# Patient Record
Sex: MALE | Race: WHITE | Employment: UNEMPLOYED | URBAN - METROPOLITAN AREA
[De-identification: names, ages, dates, MRNs, and addresses within clinical notes are randomized per-mention and may not be internally consistent; named-entity substitution may affect disease eponyms.]

---

## 2019-09-21 ENCOUNTER — APPOINTMENT (OUTPATIENT)
Dept: CT IMAGING | Age: 24
End: 2019-09-21
Payer: COMMERCIAL

## 2019-09-21 ENCOUNTER — APPOINTMENT (OUTPATIENT)
Dept: GENERAL RADIOLOGY | Age: 24
End: 2019-09-21
Payer: COMMERCIAL

## 2019-09-21 ENCOUNTER — HOSPITAL ENCOUNTER (EMERGENCY)
Age: 24
Discharge: HOME | End: 2019-09-21
Attending: EMERGENCY MEDICINE
Payer: COMMERCIAL

## 2019-09-21 VITALS
RESPIRATION RATE: 26 BRPM | BODY MASS INDEX: 23.03 KG/M2 | TEMPERATURE: 98.4 F | HEIGHT: 72 IN | DIASTOLIC BLOOD PRESSURE: 86 MMHG | HEART RATE: 118 BPM | SYSTOLIC BLOOD PRESSURE: 141 MMHG | OXYGEN SATURATION: 97 % | WEIGHT: 170 LBS

## 2019-09-21 DIAGNOSIS — S00.83XA CONTUSION OF FACE, INITIAL ENCOUNTER: Primary | ICD-10-CM

## 2019-09-21 DIAGNOSIS — F10.920 ACUTE ALCOHOLIC INTOXICATION WITHOUT COMPLICATION (HCC): ICD-10-CM

## 2019-09-21 DIAGNOSIS — S60.221A CONTUSION OF RIGHT HAND, INITIAL ENCOUNTER: ICD-10-CM

## 2019-09-21 PROCEDURE — 73130 X-RAY EXAM OF HAND: CPT

## 2019-09-21 PROCEDURE — 96375 TX/PRO/DX INJ NEW DRUG ADDON: CPT

## 2019-09-21 PROCEDURE — 70486 CT MAXILLOFACIAL W/O DYE: CPT

## 2019-09-21 PROCEDURE — 70450 CT HEAD/BRAIN W/O DYE: CPT

## 2019-09-21 PROCEDURE — 99284 EMERGENCY DEPT VISIT MOD MDM: CPT

## 2019-09-21 PROCEDURE — 6360000002 HC RX W HCPCS: Performed by: EMERGENCY MEDICINE

## 2019-09-21 PROCEDURE — 96374 THER/PROPH/DIAG INJ IV PUSH: CPT

## 2019-09-21 PROCEDURE — 72125 CT NECK SPINE W/O DYE: CPT

## 2019-09-21 RX ORDER — MORPHINE SULFATE 4 MG/ML
4 INJECTION, SOLUTION INTRAMUSCULAR; INTRAVENOUS EVERY 30 MIN PRN
Status: DISCONTINUED | OUTPATIENT
Start: 2019-09-21 | End: 2019-09-21 | Stop reason: HOSPADM

## 2019-09-21 RX ORDER — NAPROXEN 500 MG/1
500 TABLET ORAL 2 TIMES DAILY PRN
Qty: 30 TABLET | Refills: 0 | Status: SHIPPED | OUTPATIENT
Start: 2019-09-21 | End: 2020-04-22

## 2019-09-21 RX ORDER — ONDANSETRON 2 MG/ML
4 INJECTION INTRAMUSCULAR; INTRAVENOUS EVERY 6 HOURS PRN
Status: DISCONTINUED | OUTPATIENT
Start: 2019-09-21 | End: 2019-09-21 | Stop reason: HOSPADM

## 2019-09-21 RX ADMIN — ONDANSETRON 4 MG: 2 INJECTION INTRAMUSCULAR; INTRAVENOUS at 03:50

## 2019-09-21 RX ADMIN — MORPHINE SULFATE 4 MG: 4 INJECTION, SOLUTION INTRAMUSCULAR; INTRAVENOUS at 03:50

## 2019-09-21 ASSESSMENT — PAIN SCALES - GENERAL
PAINLEVEL_OUTOF10: 8
PAINLEVEL_OUTOF10: 8

## 2019-09-21 ASSESSMENT — PAIN DESCRIPTION - PAIN TYPE: TYPE: ACUTE PAIN

## 2019-09-21 ASSESSMENT — PAIN DESCRIPTION - LOCATION: LOCATION: FACE
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
## 2019-09-21 NOTE — ED NOTES
Patient reports hx of depression. Denies any suicidal thoughts at this time.  Patient states \" its getting better, that girl right there is my whole life\"        Mode Wynn RN  09/21/19 2003
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
## 2020-02-27 ENCOUNTER — APPOINTMENT (OUTPATIENT)
Dept: CT IMAGING | Age: 25
End: 2020-02-27
Payer: COMMERCIAL

## 2020-02-27 ENCOUNTER — HOSPITAL ENCOUNTER (EMERGENCY)
Age: 25
Discharge: NURSING, REHAB OR LONG-TERM CARE | End: 2020-02-27
Attending: EMERGENCY MEDICINE
Payer: COMMERCIAL

## 2020-02-27 VITALS
OXYGEN SATURATION: 99 % | HEIGHT: 72 IN | TEMPERATURE: 98.4 F | HEART RATE: 92 BPM | DIASTOLIC BLOOD PRESSURE: 88 MMHG | RESPIRATION RATE: 28 BRPM | BODY MASS INDEX: 23.03 KG/M2 | SYSTOLIC BLOOD PRESSURE: 134 MMHG | WEIGHT: 170 LBS

## 2020-02-27 LAB
ACETAMINOPHEN LEVEL: 22.8 UG/ML (ref 15–30)
ALBUMIN SERPL-MCNC: 4.5 GM/DL (ref 3.4–5)
ALCOHOL SCREEN SERUM: NORMAL %WT/VOL
ALP BLD-CCNC: 105 IU/L (ref 40–128)
ALT SERPL-CCNC: 900 U/L (ref 10–40)
AMMONIA: 40 UMOL/L (ref 16–60)
ANION GAP SERPL CALCULATED.3IONS-SCNC: 18 MMOL/L (ref 4–16)
AST SERPL-CCNC: 1538 IU/L (ref 15–37)
BASE EXCESS: ABNORMAL (ref 0–3.3)
BASOPHILS ABSOLUTE: 0.1 K/CU MM
BASOPHILS RELATIVE PERCENT: 0.9 % (ref 0–1)
BILIRUB SERPL-MCNC: 1.9 MG/DL (ref 0–1)
BUN BLDV-MCNC: 15 MG/DL (ref 6–23)
CALCIUM SERPL-MCNC: 9.6 MG/DL (ref 8.3–10.6)
CHLORIDE BLD-SCNC: 97 MMOL/L (ref 99–110)
CO2: 20 MMOL/L (ref 21–32)
COMMENT: ABNORMAL
CREAT SERPL-MCNC: 1 MG/DL (ref 0.9–1.3)
DIFFERENTIAL TYPE: ABNORMAL
DOSE AMOUNT: ABNORMAL
DOSE AMOUNT: NORMAL
DOSE TIME: ABNORMAL
DOSE TIME: NORMAL
EOSINOPHILS ABSOLUTE: 0 K/CU MM
EOSINOPHILS RELATIVE PERCENT: 0.1 % (ref 0–3)
GFR AFRICAN AMERICAN: >60 ML/MIN/1.73M2
GFR NON-AFRICAN AMERICAN: >60 ML/MIN/1.73M2
GLUCOSE BLD-MCNC: 128 MG/DL (ref 70–99)
HAV IGM SER IA-ACNC: NON REACTIVE
HCO3 VENOUS: 22.4 MMOL/L (ref 19–25)
HCT VFR BLD CALC: 43.9 % (ref 42–52)
HEMOGLOBIN: 15.6 GM/DL (ref 13.5–18)
HEPATITIS B CORE IGM ANTIBODY: NON REACTIVE
HEPATITIS B SURFACE ANTIGEN: NON REACTIVE
HEPATITIS C ANTIBODY: NON REACTIVE
IMMATURE NEUTROPHIL %: 0.4 % (ref 0–0.43)
INR BLD: 1.16 INDEX
LACTATE DEHYDROGENASE: 1102 IU/L (ref 120–246)
LACTATE: 1.7 MMOL/L (ref 0.4–2)
LIPASE: 14 IU/L (ref 13–60)
LYMPHOCYTES ABSOLUTE: 0.8 K/CU MM
LYMPHOCYTES RELATIVE PERCENT: 8.7 % (ref 24–44)
MCH RBC QN AUTO: 31.3 PG (ref 27–31)
MCHC RBC AUTO-ENTMCNC: 35.5 % (ref 32–36)
MCV RBC AUTO: 88.2 FL (ref 78–100)
MONOCYTES ABSOLUTE: 1 K/CU MM
MONOCYTES RELATIVE PERCENT: 11.1 % (ref 0–4)
NUCLEATED RBC %: 0 %
O2 SAT, VEN: 94.3 % (ref 50–70)
OSMOLALITY: 286 MOS/L (ref 280–300)
PCO2, VEN: 37 MMHG (ref 38–52)
PDW BLD-RTO: 11.6 % (ref 11.7–14.9)
PH VENOUS: 7.39 (ref 7.32–7.42)
PLATELET # BLD: 233 K/CU MM (ref 140–440)
PMV BLD AUTO: 9.7 FL (ref 7.5–11.1)
PO2, VEN: 204 MMHG (ref 28–48)
POTASSIUM SERPL-SCNC: 3.7 MMOL/L (ref 3.5–5.1)
PROTHROMBIN TIME: 14.1 SECONDS (ref 11.7–14.5)
RBC # BLD: 4.98 M/CU MM (ref 4.6–6.2)
SALICYLATE LEVEL: <0.3 MG/DL (ref 15–30)
SEGMENTED NEUTROPHILS ABSOLUTE COUNT: 7.3 K/CU MM
SEGMENTED NEUTROPHILS RELATIVE PERCENT: 78.8 % (ref 36–66)
SODIUM BLD-SCNC: 135 MMOL/L (ref 135–145)
TOTAL CK: 169 IU/L (ref 38–174)
TOTAL IMMATURE NEUTOROPHIL: 0.04 K/CU MM
TOTAL NUCLEATED RBC: 0 K/CU MM
TOTAL PROTEIN: 7.1 GM/DL (ref 6.4–8.2)
WBC # BLD: 9.2 K/CU MM (ref 4–10.5)

## 2020-02-27 PROCEDURE — 96366 THER/PROPH/DIAG IV INF ADDON: CPT

## 2020-02-27 PROCEDURE — 85025 COMPLETE CBC W/AUTO DIFF WBC: CPT

## 2020-02-27 PROCEDURE — 74177 CT ABD & PELVIS W/CONTRAST: CPT

## 2020-02-27 PROCEDURE — 82805 BLOOD GASES W/O2 SATURATION: CPT

## 2020-02-27 PROCEDURE — G0480 DRUG TEST DEF 1-7 CLASSES: HCPCS

## 2020-02-27 PROCEDURE — 80074 ACUTE HEPATITIS PANEL: CPT

## 2020-02-27 PROCEDURE — 6360000002 HC RX W HCPCS: Performed by: PHYSICIAN ASSISTANT

## 2020-02-27 PROCEDURE — 99285 EMERGENCY DEPT VISIT HI MDM: CPT

## 2020-02-27 PROCEDURE — 2580000003 HC RX 258: Performed by: PHYSICIAN ASSISTANT

## 2020-02-27 PROCEDURE — 83605 ASSAY OF LACTIC ACID: CPT

## 2020-02-27 PROCEDURE — 93010 ELECTROCARDIOGRAM REPORT: CPT | Performed by: INTERNAL MEDICINE

## 2020-02-27 PROCEDURE — 96372 THER/PROPH/DIAG INJ SC/IM: CPT

## 2020-02-27 PROCEDURE — 80053 COMPREHEN METABOLIC PANEL: CPT

## 2020-02-27 PROCEDURE — 6360000004 HC RX CONTRAST MEDICATION: Performed by: PHYSICIAN ASSISTANT

## 2020-02-27 PROCEDURE — 93005 ELECTROCARDIOGRAM TRACING: CPT | Performed by: PHYSICIAN ASSISTANT

## 2020-02-27 PROCEDURE — 82550 ASSAY OF CK (CPK): CPT

## 2020-02-27 PROCEDURE — 83690 ASSAY OF LIPASE: CPT

## 2020-02-27 PROCEDURE — 82140 ASSAY OF AMMONIA: CPT

## 2020-02-27 PROCEDURE — 83615 LACTATE (LD) (LDH) ENZYME: CPT

## 2020-02-27 PROCEDURE — 96365 THER/PROPH/DIAG IV INF INIT: CPT

## 2020-02-27 PROCEDURE — 83930 ASSAY OF BLOOD OSMOLALITY: CPT

## 2020-02-27 PROCEDURE — 85610 PROTHROMBIN TIME: CPT

## 2020-02-27 RX ORDER — SODIUM CHLORIDE 0.9 % (FLUSH) 0.9 %
10 SYRINGE (ML) INJECTION 2 TIMES DAILY
Status: DISCONTINUED | OUTPATIENT
Start: 2020-02-27 | End: 2020-02-27 | Stop reason: HOSPADM

## 2020-02-27 RX ORDER — PROMETHAZINE HYDROCHLORIDE 25 MG/ML
25 INJECTION, SOLUTION INTRAMUSCULAR; INTRAVENOUS ONCE
Status: COMPLETED | OUTPATIENT
Start: 2020-02-27 | End: 2020-02-27

## 2020-02-27 RX ADMIN — PROMETHAZINE HYDROCHLORIDE 25 MG: 25 INJECTION INTRAMUSCULAR; INTRAVENOUS at 06:22

## 2020-02-27 RX ADMIN — ACETYLCYSTEINE 3860 MG: 200 INJECTION, SOLUTION INTRAVENOUS at 06:50

## 2020-02-27 RX ADMIN — ACETYLCYSTEINE 11560 MG: 200 INJECTION, SOLUTION INTRAVENOUS at 05:27

## 2020-02-27 RX ADMIN — IOPAMIDOL 80 ML: 755 INJECTION, SOLUTION INTRAVENOUS at 04:58

## 2020-02-27 ASSESSMENT — PAIN SCALES - GENERAL: PAINLEVEL_OUTOF10: 9

## 2020-02-27 ASSESSMENT — PAIN DESCRIPTION - ORIENTATION: ORIENTATION: LOWER

## 2020-02-27 ASSESSMENT — PAIN DESCRIPTION - FREQUENCY: FREQUENCY: CONTINUOUS

## 2020-02-27 ASSESSMENT — PAIN DESCRIPTION - LOCATION: LOCATION: ABDOMEN

## 2020-02-27 NOTE — ED NOTES
Report given to [employer] at this time     [facility] [place]  02/27/20 5848

## 2020-02-27 NOTE — ED NOTES
Pt talking with sister at this time on the phone     Brian Perez, [place]  02/27/20 0415

## 2020-02-27 NOTE — ED NOTES
Pt continues to rest with eyes closed. No further N/V observed. Resp even and unlabored. Sitter remains at bedside.       Bg Laurent RN  02/27/20 5021

## 2020-02-27 NOTE — ED NOTES
This nurse ask patient about suicidal or homicidal ideation with any thoughts of hurting himself or anyone else. Patient states \"not anyone else\". Patient has scabbed superficial cuts to bilateral arms when asked about that he states \" that's irrelivent\" patient does not elaborate on plan of suicidal ideation, out long this has been going on. Patient immediately placed on suicidal precautions. Patient remains on cardiac monitor per order by [facility]. Patients reports that it was tylenol PM that he took.       Jaylin Khanna, RN  02/27/20 1627

## 2020-02-27 NOTE — ED NOTES
Spoke with Agueda Lauren from lab notified of additional orders, states he needs to [place] out whats going on with the orders and will call this nurse back\"        Iona Duran RN  02/27/20 3675

## 2020-02-27 NOTE — ED PROVIDER NOTES
UE.  Neurologic:  Alert, oriented, somewhat lethargic. Psychiatric:  Uncooperative, flat affect. RADIOLOGY/PROCEDURES    Labs Reviewed   CBC WITH AUTO DIFFERENTIAL - Abnormal; Notable for the following components:       Result Value    MCH 31.3 (*)     RDW 11.6 (*)     Segs Relative 78.8 (*)     Lymphocytes % 8.7 (*)     Monocytes % 11.1 (*)     All other components within normal limits   COMPREHENSIVE METABOLIC PANEL W/ REFLEX TO MG FOR LOW K - Abnormal; Notable for the following components:    Chloride 97 (*)     CO2 20 (*)     Glucose 128 (*)     Total Bilirubin 1.9 (*)      (*)     AST 1,538 (*)     Anion Gap 18 (*)     All other components within normal limits   SALICYLATE LEVEL - Abnormal; Notable for the following components:    Salicylate Lvl <3.6 (*)     All other components within normal limits   BLOOD GAS, VENOUS - Abnormal; Notable for the following components:    pCO2, Rodolfo 37 (*)     pO2, [place]olfo 204 (*)     O2 Sat, [place]olfo 94.3 (*)     All other components within normal limits   LACTATE DEHYDROGENASE - Abnormal; Notable for the following components:    LD 1,102 (*)     All other components within normal limits   LIPASE   LACTIC ACID, PLASMA   ACETAMINOPHEN LEVEL   ETHANOL   PROTIME-INR   CK   HEPATITIS PANEL, ACUTE   OSMOLALITY   AMMONIA   URINE RT REFLEX TO CULTURE   URINE DRUG SCREEN     Ct Abdomen Pelvis W Iv Contrast Additional Contrast? None    Result Date: 2/27/2020  1. No acute findings in the abdomen or pelvis. 2. Hepatic steatosis     ED COURSE & MEDICAL DECISION MAKING    Pertinent Labs & Imaging studies reviewed. (See chart for details)  -  Patient seen and evaluated in the emergency department. -  Triage and nursing notes reviewed and incorporated. -  Old chart records reviewed and incorporated. -  Patient case discussed with attending physician, Dr. Suzanne Gage. They saw and examined patient.   -  Differential diagnosis includes: depression, suicidal, behavior disorder, schizophrenia, schizoaffective disorder, manic, dementia, delirium, alcohol intoxication, drug toxicity, and others  -  Work-up included:  See above  -  Poison Control was consulted upon patient's arrival to the ED. I discussed case with Isha Holt RN. Recommendation to initiate NAC protocol pending lab results. -  Patient treated with NAC in the ED.  -  Results discussed with patient. Normal white count, H&H. Total bili is 1.9, , AST 1500, Alk Phos 105. Anion gap is 18. Lactic is normal.  LDH 1100. INR WNL. Tylenol level 22. Negative ETOH. Negative salicylate. CT abd pelv shows hepatic steatosis, no acute findings. Dr. Ashkan Carmen discussed the case with Dr. Ashwin Aguilera, who feels patient would be best managed at [facility]. Case discussed there and patient accepted in transfer to their ED. CRITICAL CARE NOTE:  There was a high probability of clinically significant life-threatening deterioration of the patient's condition requiring my urgent intervention due to Tylenol overdose. Telemetry monitoring, review of labs and imaging, consult to Poison Control, GI, [facility], initiation of NAC protocol was performed to address this. Total critical care time is at least  50 minutes. This includes vital sign monitoring, pulse oximetry monitoring, telemetry monitoring, clinical response to the IV medications, reviewing the nursing notes, consultation time, dictation/documentation time, and interpretation of the lab work. This time excludes time spent performing procedures and separately billable procedures and family discussion time. FINAL IMPRESSION    1. Acute liver failure without hepatic coma    2. Encephalopathy    3.  Suicidal behavior with attempted self-injury [facility])                Bashir Sim PA-C  02/28/20 2021

## 2020-02-27 NOTE — ED NOTES
Pt rests quietly at present at this time. Resp even and unlabored.  Sitter remains 1:1.      Christine Nevarez RN  02/27/20 5086

## 2020-02-27 NOTE — ED NOTES
2/27/2020 1138- entered chart to find where pt was transferred to for family     Jenna Broderick RN  02/27/20 1130

## 2020-02-27 NOTE — ED PROVIDER NOTES
ED Attestation: PA/CLAUDIA  Face-to-face evaluation was performed by me in collaboration with the advanced practice provider to assess for significant health threats. On my exam: Patient appears uncomfortable and is less than fully oriented. Abdomen is diffusely tender but soft throughout. My differential includes: Possible hepatic injury secondary to Tylenol ingestion. Patient Tylenol is minimally elevated but according to him he is 13 hours from onset. Patient is transaminases are very elevated but normal alk phos and normal INR and normal ammonia. Case was discussed with Dr. Crow Mcdaniels who recommended transfer to [facility]. Patient was treated empirically with NAC. Patient was intermittently bradycardic which could be some hemodynamic instability secondary to the mixed toxidrome. Patient remained clinically stable throughout the remainder of my ED evaluation.     Kimberly Sarmiento MD  02/27/20 8319

## 2020-03-04 LAB
EKG ATRIAL RATE: 43 BPM
EKG DIAGNOSIS: NORMAL
EKG P AXIS: 13 DEGREES
EKG P-R INTERVAL: 124 MS
EKG Q-T INTERVAL: 522 MS
EKG QRS DURATION: 102 MS
EKG QTC CALCULATION (BAZETT): 441 MS
EKG R AXIS: 87 DEGREES
EKG T AXIS: 61 DEGREES
EKG VENTRICULAR RATE: 43 BPM

## 2020-04-22 ENCOUNTER — HOSPITAL ENCOUNTER (EMERGENCY)
Age: 25
Discharge: HOME | End: 2020-04-22
Payer: COMMERCIAL

## 2020-04-22 ENCOUNTER — APPOINTMENT (OUTPATIENT)
Dept: GENERAL RADIOLOGY | Age: 25
End: 2020-04-22
Payer: COMMERCIAL

## 2020-04-22 VITALS
OXYGEN SATURATION: 98 % | TEMPERATURE: 98.3 F | DIASTOLIC BLOOD PRESSURE: 89 MMHG | BODY MASS INDEX: 25.06 KG/M2 | SYSTOLIC BLOOD PRESSURE: 154 MMHG | HEART RATE: 97 BPM | RESPIRATION RATE: 20 BRPM | HEIGHT: 72 IN | WEIGHT: 185 LBS

## 2020-04-22 PROCEDURE — 73590 X-RAY EXAM OF LOWER LEG: CPT

## 2020-04-22 PROCEDURE — 99283 EMERGENCY DEPT VISIT LOW MDM: CPT

## 2020-04-22 RX ORDER — NAPROXEN 500 MG/1
500 TABLET ORAL 2 TIMES DAILY PRN
Qty: 20 TABLET | Refills: 0 | Status: SHIPPED | OUTPATIENT
Start: 2020-04-22 | End: 2020-05-02

## 2020-04-22 RX ORDER — IBUPROFEN 600 MG/1
600 TABLET ORAL ONCE
Status: DISCONTINUED | OUTPATIENT
Start: 2020-04-22 | End: 2020-04-22 | Stop reason: HOSPADM

## 2020-04-22 ASSESSMENT — PAIN DESCRIPTION - PAIN TYPE: TYPE: ACUTE PAIN

## 2020-04-22 ASSESSMENT — PAIN SCALES - GENERAL: PAINLEVEL_OUTOF10: 7

## 2020-04-22 NOTE — ED PROVIDER NOTES
level: None   Occupational History    None   Social Needs    Financial resource strain: None    Food insecurity     Worry: None     Inability: None    Transportation needs     Medical: None     Non-medical: None   Tobacco Use    Smoking status: Current Every Day Smoker     Packs/day: 0.50    Smokeless tobacco: Never Used   Substance and Sexual Activity    Alcohol use: No    Drug use: No    Sexual activity: None   Lifestyle    Physical activity     Days per week: None     Minutes per session: None    Stress: None   Relationships    Social connections     Talks on phone: None     Gets together: None     Attends Religion service: None     Active member of club or organization: None     Attends meetings of clubs or organizations: None     Relationship status: None    Intimate partner violence     Fear of current or ex partner: None     Emotionally abused: None     Physically abused: None     Forced sexual activity: None   Other Topics Concern    None   Social History Narrative    None     History reviewed. No pertinent family history. PHYSICAL EXAM    VITAL SIGNS: BP (!) 154/89   Pulse 97   Temp 98.3 °F (36.8 °C) (Oral)   Resp 20   Ht 6' (1.829 m)   Wt 185 lb (83.9 kg)   SpO2 98%   BMI 25.09 kg/m²   Constitutional:  Well developed, well nourished, no acute distress, non-toxic appearance   HENT:  Atraumatic, Normocephalic, EOMIs, conjunctiva clear, nasal bones midline  Musculoskeletal:    Right lower leg exam:   -Inspection:  No obvious defects or deformities. There is a developing hematoma/ecchymotic bruising along the mid to proximal right anterior shin sitting onto the medial calf, approximately 5 x 6 cm in size. Overlying abrasion without purulence or active bleeding. Bruising and swelling is not circumferential to the right calf does not extend into the knee joint or ankle. - Palpation: No redness, or warmth. No localized knee joint or ankle pain or palpable effusion.   Localized cannot completely rule out an occult nondisplaced osseous abnormality. We discussed symptomatic care and outpatient follow-up with orthopedics and her PCP if symptoms persist or worsen as he may benefit from repeat x-rays or advanced imaging. No evidence of neurovascular/arterial injury at this time or ischemic limb or compartment syndrome requiring emergent advanced imaging. Low clinical suspicion for ischemic limb, compartment syndrome, intra-articular joint infection/septic arthritis, DVT, osteomyelitis, arterial/neurologic injury, necrotizing fasciitis, or infected/rapidly expanding hematoma. Patient is discharged in stable condition. Educated on [address] therapy. Given prescription for anti-inflammatories. May continue weightbearing as tolerated. Encouraged rigorous ice locations elevation. . Patient is instructed to followup with orthopedics in 7-10 days, sooner with worsened symptoms. Return precautions back to the ED discussed for any new or worsening symptoms. Patient does not have PCP so I have given him/her doctor of the day contact information and encourage him/her to establish care and followup in the next 1-2 days. Patient/surrogate and I discussed the incidental findings noted on imaging studies today - benign osteochondroma. We discussed the importance of timely outpatient follow-up for further evaluation and management as cannot completely rule out a malignant lesion or process. He or she understands that failure to follow up could increase risk of delayed diagnosis and potentially worsening condition. Diagnosis and plan discussed in detail with patient who understands and agrees. Patient agrees to return emergency department if symptoms worsen or any new symptoms develop.     In light of current events, I did utilize appropriate PPE (including N95 face mask, safety glasses, gloves, as recommended by the health facility/national standard best practice, during my bedside interactions with the

## 2020-12-27 ENCOUNTER — HOSPITAL ENCOUNTER (OUTPATIENT)
Age: 25
Setting detail: SPECIMEN
Discharge: HOME | End: 2020-12-27
Payer: COMMERCIAL

## 2020-12-27 PROCEDURE — U0002 COVID-19 LAB TEST NON-CDC: HCPCS

## 2020-12-30 LAB
SARS-COV-2: NOT DETECTED
SOURCE: NORMAL

## 2022-07-02 ENCOUNTER — HOSPITAL ENCOUNTER (EMERGENCY)
Age: 27
Discharge: HOME | End: 2022-07-02
Attending: EMERGENCY MEDICINE
Payer: COMMERCIAL

## 2022-07-02 ENCOUNTER — APPOINTMENT (OUTPATIENT)
Dept: CT IMAGING | Age: 27
End: 2022-07-02
Payer: COMMERCIAL

## 2022-07-02 VITALS
TEMPERATURE: 98 F | HEIGHT: 72 IN | WEIGHT: 180 LBS | DIASTOLIC BLOOD PRESSURE: 70 MMHG | RESPIRATION RATE: 16 BRPM | SYSTOLIC BLOOD PRESSURE: 145 MMHG | HEART RATE: 78 BPM | BODY MASS INDEX: 24.38 KG/M2 | OXYGEN SATURATION: 99 %

## 2022-07-02 DIAGNOSIS — T40.2X1A OPIOID OVERDOSE, ACCIDENTAL OR UNINTENTIONAL, INITIAL ENCOUNTER (HCC): Primary | ICD-10-CM

## 2022-07-02 PROCEDURE — 71250 CT THORAX DX C-: CPT

## 2022-07-02 PROCEDURE — 70450 CT HEAD/BRAIN W/O DYE: CPT

## 2022-07-02 PROCEDURE — 72125 CT NECK SPINE W/O DYE: CPT

## 2022-07-02 PROCEDURE — 99284 EMERGENCY DEPT VISIT MOD MDM: CPT

## 2022-07-03 NOTE — ED NOTES
Patient placed in police custody,handcuffed to the bed per spd     Ryan Serna, [place]  07/02/22 4347

## 2022-07-03 NOTE — ED PROVIDER NOTES
CHIEF COMPLAINT    Chief Complaint   Patient presents with    Drug Overdose     HPI  Petey Bar is a 32 y.o. male who presents to the ED with history of depression who presents in police custody after opioid overdose. Bystanders called EMS for unresponsiveness. He apparently received Narcan from bystanders as well as EMS with improvement of his respiratory mental status. Initially was found to have very minimal respirations on his own and was unresponsive. Patient arrives here and states he was consuming alcohol today and initially denies any recreational drug use. After being informed that Narcan will come up he states he may have taken something. He currently is complaining of headache and neck pain. No reports of trauma although the full history of outpatient that up on the ground is uncertain. Headache and neck pain described as throbbing aching pain located along the occipital region of the head and midline of the cervical spine. Pain does not radiate. Nothing seems to make the pain particular better or worse. He denies homicidal ideation, suicidal ideation, chest pain, shortness of breath, nausea, vomiting, numbness, tingling. REVIEW OF SYSTEMS  Constitutional: No fever, chills or recent illness. Eye: No visual changes  HENT: No earache or sore throat. Resp: No SOB or productive cough. Cardio: No chest pain or palpitations. GI: No abdominal pain, nausea, vomiting, constipation or diarrhea. No melena. : No dysuria, urgency or frequency. Endocrine: No heat intolerance, no cold intolerance, no polydipsia   Lymphatics: No adenopathy  Musculoskeletal: Complains of neck pain  Neuro: Complains of headache  Psych: No homicidal or suicidal thoughts  Skin: No rash, No itching. ?  ? PAST MEDICAL HISTORY  History reviewed. No pertinent past medical history. FAMILY HISTORY  History reviewed. No pertinent family history.   SOCIAL HISTORY  Social History     Socioeconomic History    Marital status: Single     Spouse name: None    Number of children: None    Years of education: None    Highest education level: None   Occupational History    None   Tobacco Use    Smoking status: Current Every Day Smoker     Packs/day: 0.50    Smokeless tobacco: Never Used   Substance and Sexual Activity    Alcohol use: Yes    Drug use: Yes    Sexual activity: None   Other Topics Concern    None   Social History Narrative    None     Social Determinants of Health     Financial Resource Strain:     Difficulty of Paying Living Expenses: Not on file   Food Insecurity:     Worried About Running Out of Food in the Last Year: Not on file    Ronald of Food in the Last Year: Not on file   Transportation Needs:     Lack of Transportation (Medical): Not on file    Lack of Transportation (Non-Medical): Not on file   Physical Activity:     Days of Exercise per Week: Not on file    Minutes of Exercise per Session: Not on file   Stress:     Feeling of Stress : Not on file   Social Connections:     Frequency of Communication with Friends and Family: Not on file    Frequency of Social Gatherings with Friends and Family: Not on file    Attends Congregation Services: Not on file    Active Member of [address] [place] [address] or Organizations: Not on file    Attends Club or Organization Meetings: Not on file    Marital Status: Not on file   Intimate Partner Violence:     Fear of Current or Ex-Partner: Not on file    Emotionally Abused: Not on file    Physically Abused: Not on file    Sexually Abused: Not on file   Housing Stability:     Unable to Pay for Housing in the Last Year: Not on file    Number of [place] in the Last Year: Not on file    Unstable Housing in the Last Year: Not on file       SURGICAL HISTORY  History reviewed. No pertinent surgical history.   CURRENT MEDICATIONS  Previous Medications    NAPROXEN (NAPROSYN) 500 MG TABLET    Take 1 tablet by mouth 2 times daily as needed for Pain     ALLERGIES  Allergies Allergen Reactions    Amoxicillin-Pot Clavulanate        Nursing notes reviewed by myself for past medical history, family history, social history, surgical history, current medications, and allergies. PHYSICAL EXAM  VITAL SIGNS: Triage VS:    ED Triage Vitals [07/02/22 2046]   Enc Vitals Group      /80      Heart Rate 92      Resp 16      Temp       Temp src       SpO2 98 %      Weight 180 lb (81.6 kg)      Height 6' (1.829 m)      Head Circumference       Peak Flow       Pain Score       Pain Loc       Pain Edu? Excl. in [address]? Constitutional: Well developed, Well nourished, nontoxic appearing  HENT: Normocephalic, Atraumatic, Bilateral external ears normal, Oropharynx moist, No oral exudates, Nose normal.   Eyes: PERRL, EOMI, Conjunctiva normal, No discharge. No scleral icterus. Neck: Tenderness to palpation of midline cervical spine  Lymphatic: No lymphadenopathy noted. Cardiovascular: Normal heart rate, Normal rhythm, No murmurs, gallops or rubs. Thorax & Lungs: Normal breath sounds, No respiratory distress, No wheezing. Skin: Warm, Dry, Pink, No mottling, No erythema, No rash. Back: No tenderness, No CVA tenderness. Extremities: No edema, No tenderness, No cyanosis, Normal perfusion, No clubbing. Musculoskeletal: Good range of motion in all major joints as observed. No major deformities noted. Neurologic: Alert & oriented x 3, GCS 15, normal motor function, Normal sensory function, CN II-XII grossly intact as tested, No focal deficits noted. Psychiatric: Affect normal, Judgment normal, Mood normal.     RADIOLOGY  Labs Reviewed - No data to display  I personally reviewed the images. The radiologist's interpretation reveals:  Last Imaging results   CT CHEST WO CONTRAST   Preliminary Result   1. No acute cardiopulmonary disease. 2. No pneumothorax. Paraseptal emphysema is identified in the bilateral lung   apices.          CT HEAD WO CONTRAST   Final Result   Head CT: No acute intracranial abnormality detected. Cervical spine CT: No acute fracture or traumatic malalignment. Questionable small pneumothorax at the left lung apex. Further evaluation   with chest CT is recommended. CT CERVICAL SPINE WO CONTRAST   Final Result   Head CT: No acute intracranial abnormality detected. Cervical spine CT: No acute fracture or traumatic malalignment. Questionable small pneumothorax at the left lung apex. Further evaluation   with chest CT is recommended. MEDS GIVEN IN ED:  Medications - No data to display  [address]    12-year-old male presents the emergency department in police custody after receiving Narcan for suspected opioid overdose. He had improvement of his respiratory mental status receiving Narcan. Initial vital signs here are reassuring. He is nontoxic-appearing on exam.  He is complaining of occipital headache and neck pain with some midline cervical spine tenderness. His neurological exam is otherwise nonfocal GCS 15. He has no homicidal or suicidal thoughts. At this time we will obtain CT imaging of the head and cervical spine given the uncertain history on how patient ended up on the ground after the opioid overdose. We will observe him for 90 minutes and plan will be for discharge. Patient CT of the head is without acute intracranial pathology. CT of the cervical spine is without acute traumatic pathology but shows concern for possible pneumothorax in the left lung apex. Therefore CT of the chest was obtained and does not demonstrate any evidence of pneumothorax. Patient was observed for over 2 hours and 45 minutes and remained without oxygen saturation or worsening mental depression. At this time I feel he is appropriate for discharge. Return precautions provided.       Amount and/or Complexity of Data Reviewed  Clinical lab tests: reviewed  Decide to obtain previous medical records or to obtain history from someone other than the patient: yes       -  Patient seen and evaluated in the emergency department. -  Triage and nursing notes reviewed and incorporated. -  Old chart records reviewed and incorporated. -  Work-up included:  See above  -  Results discussed with patient. Appropriate PPE utilized as indicated for entire patient encounter? Time of Disposition: See timeline  ? New Prescriptions    No medications on file     FINAL IMPRESSION  1. Opioid overdose, accidental or unintentional, initial encounter [facility])        Electronically signed by:  [address], DO, 7/2/2022         [address], DO  07/02/22 1002

## 2022-07-03 NOTE — ED NOTES
Pt. reviewed discharge instructions, follow up instructions. Pt. verbalizes understanding with no further questions. Pt. ambulatory and not showing any signs of distress. Pt. Left in police custody.        Ronen Richter RN  07/02/22 8942

## 2022-07-03 NOTE — ED TRIAGE NOTES
To ED per squad for overdose informed per medics that they gave him 4 mg of narcan iv pta and bystanders may have given him some before they arrived,patient denied using any drugs,alert and oriented on arrival,spd at bedside

## 2022-07-19 ENCOUNTER — APPOINTMENT (OUTPATIENT)
Dept: CT IMAGING | Age: 27
End: 2022-07-19
Payer: COMMERCIAL

## 2022-07-19 ENCOUNTER — APPOINTMENT (OUTPATIENT)
Dept: GENERAL RADIOLOGY | Age: 27
End: 2022-07-19
Payer: COMMERCIAL

## 2022-07-19 ENCOUNTER — HOSPITAL ENCOUNTER (EMERGENCY)
Age: 27
Discharge: HOME | End: 2022-07-19
Attending: STUDENT IN AN ORGANIZED HEALTH CARE EDUCATION/TRAINING PROGRAM
Payer: COMMERCIAL

## 2022-07-19 VITALS
TEMPERATURE: 101.8 F | OXYGEN SATURATION: 99 % | BODY MASS INDEX: 24.38 KG/M2 | HEIGHT: 72 IN | WEIGHT: 180 LBS | SYSTOLIC BLOOD PRESSURE: 118 MMHG | RESPIRATION RATE: 22 BRPM | HEART RATE: 106 BPM | DIASTOLIC BLOOD PRESSURE: 56 MMHG

## 2022-07-19 DIAGNOSIS — R56.9 SEIZURE (HCC): Primary | ICD-10-CM

## 2022-07-19 DIAGNOSIS — U07.1 COVID: ICD-10-CM

## 2022-07-19 LAB
ALBUMIN SERPL-MCNC: 4 GM/DL (ref 3.4–5)
ALP BLD-CCNC: 75 IU/L (ref 40–129)
ALT SERPL-CCNC: 17 U/L (ref 10–40)
AMPHETAMINES: NEGATIVE
ANION GAP SERPL CALCULATED.3IONS-SCNC: 9 MMOL/L (ref 4–16)
AST SERPL-CCNC: 17 IU/L (ref 15–37)
BACTERIA: NEGATIVE /HPF
BARBITURATE SCREEN URINE: NEGATIVE
BASOPHILS ABSOLUTE: 0 K/CU MM
BASOPHILS RELATIVE PERCENT: 0.7 % (ref 0–1)
BENZODIAZEPINE SCREEN, URINE: NEGATIVE
BILIRUB SERPL-MCNC: 0.4 MG/DL (ref 0–1)
BILIRUBIN URINE: NEGATIVE MG/DL
BLOOD, URINE: NEGATIVE
BUN BLDV-MCNC: 8 MG/DL (ref 6–23)
CALCIUM SERPL-MCNC: 8.7 MG/DL (ref 8.3–10.6)
CANNABINOID SCREEN URINE: NEGATIVE
CHLORIDE BLD-SCNC: 101 MMOL/L (ref 99–110)
CLARITY: CLEAR
CO2: 26 MMOL/L (ref 21–32)
COCAINE METABOLITE: NEGATIVE
COLOR: COLORLESS
CREAT SERPL-MCNC: 0.8 MG/DL (ref 0.9–1.3)
DIFFERENTIAL TYPE: ABNORMAL
EOSINOPHILS ABSOLUTE: 0.1 K/CU MM
EOSINOPHILS RELATIVE PERCENT: 1.4 % (ref 0–3)
GFR AFRICAN AMERICAN: >60 ML/MIN/1.73M2
GFR NON-AFRICAN AMERICAN: >60 ML/MIN/1.73M2
GLUCOSE BLD-MCNC: 86 MG/DL (ref 70–99)
GLUCOSE, URINE: NEGATIVE MG/DL
HCT VFR BLD CALC: 34.8 % (ref 42–52)
HEMOGLOBIN: 12 GM/DL (ref 13.5–18)
IMMATURE NEUTROPHIL %: 0.3 % (ref 0–0.43)
KETONES, URINE: NEGATIVE MG/DL
LACTATE: 1.4 MMOL/L (ref 0.4–2)
LEUKOCYTE ESTERASE, URINE: NEGATIVE
LYMPHOCYTES ABSOLUTE: 0.2 K/CU MM
LYMPHOCYTES RELATIVE PERCENT: 3.4 % (ref 24–44)
MCH RBC QN AUTO: 30.7 PG (ref 27–31)
MCHC RBC AUTO-ENTMCNC: 34.5 % (ref 32–36)
MCV RBC AUTO: 89 FL (ref 78–100)
MONOCYTES ABSOLUTE: 0.5 K/CU MM
MONOCYTES RELATIVE PERCENT: 8.4 % (ref 0–4)
NITRITE URINE, QUANTITATIVE: NEGATIVE
NUCLEATED RBC %: 0 %
OPIATES, URINE: NEGATIVE
OXYCODONE: NEGATIVE
PDW BLD-RTO: 11.5 % (ref 11.7–14.9)
PH, URINE: 7.5 (ref 5–8)
PHENCYCLIDINE, URINE: NEGATIVE
PLATELET # BLD: 160 K/CU MM (ref 140–440)
PMV BLD AUTO: 9.9 FL (ref 7.5–11.1)
POTASSIUM SERPL-SCNC: 3.6 MMOL/L (ref 3.5–5.1)
PROTEIN UA: NEGATIVE MG/DL
RBC # BLD: 3.91 M/CU MM (ref 4.6–6.2)
RBC URINE: ABNORMAL /HPF (ref 0–3)
SARS-COV-2, NAAT: DETECTED
SEGMENTED NEUTROPHILS ABSOLUTE COUNT: 5 K/CU MM
SEGMENTED NEUTROPHILS RELATIVE PERCENT: 85.8 % (ref 36–66)
SODIUM BLD-SCNC: 136 MMOL/L (ref 135–145)
SOURCE: ABNORMAL
SPECIFIC GRAVITY UA: 1.01 (ref 1–1.03)
TOTAL IMMATURE NEUTOROPHIL: 0.02 K/CU MM
TOTAL NUCLEATED RBC: 0 K/CU MM
TOTAL PROTEIN: 6 GM/DL (ref 6.4–8.2)
TRICHOMONAS: ABNORMAL /HPF
UROBILINOGEN, URINE: 0.2 MG/DL (ref 0.2–1)
WBC # BLD: 5.8 K/CU MM (ref 4–10.5)
WBC UA: ABNORMAL /HPF (ref 0–2)

## 2022-07-19 PROCEDURE — 80307 DRUG TEST PRSMV CHEM ANLYZR: CPT

## 2022-07-19 PROCEDURE — 70450 CT HEAD/BRAIN W/O DYE: CPT

## 2022-07-19 PROCEDURE — 6360000002 HC RX W HCPCS: Performed by: PHYSICIAN ASSISTANT

## 2022-07-19 PROCEDURE — 6360000004 HC RX CONTRAST MEDICATION: Performed by: STUDENT IN AN ORGANIZED HEALTH CARE EDUCATION/TRAINING PROGRAM

## 2022-07-19 PROCEDURE — 71045 X-RAY EXAM CHEST 1 VIEW: CPT

## 2022-07-19 PROCEDURE — 85025 COMPLETE CBC W/AUTO DIFF WBC: CPT

## 2022-07-19 PROCEDURE — 87635 SARS-COV-2 COVID-19 AMP PRB: CPT

## 2022-07-19 PROCEDURE — 96375 TX/PRO/DX INJ NEW DRUG ADDON: CPT

## 2022-07-19 PROCEDURE — 81001 URINALYSIS AUTO W/SCOPE: CPT

## 2022-07-19 PROCEDURE — 74177 CT ABD & PELVIS W/CONTRAST: CPT

## 2022-07-19 PROCEDURE — 96365 THER/PROPH/DIAG IV INF INIT: CPT

## 2022-07-19 PROCEDURE — 87040 BLOOD CULTURE FOR BACTERIA: CPT

## 2022-07-19 PROCEDURE — 83605 ASSAY OF LACTIC ACID: CPT

## 2022-07-19 PROCEDURE — 2580000003 HC RX 258: Performed by: PHYSICIAN ASSISTANT

## 2022-07-19 PROCEDURE — 80053 COMPREHEN METABOLIC PANEL: CPT

## 2022-07-19 PROCEDURE — 99285 EMERGENCY DEPT VISIT HI MDM: CPT

## 2022-07-19 RX ORDER — 0.9 % SODIUM CHLORIDE 0.9 %
1000 INTRAVENOUS SOLUTION INTRAVENOUS ONCE
Status: COMPLETED | OUTPATIENT
Start: 2022-07-19 | End: 2022-07-19

## 2022-07-19 RX ORDER — ACETAMINOPHEN 500 MG
1000 TABLET ORAL EVERY 6 HOURS PRN
Qty: 60 TABLET | Refills: 0 | Status: SHIPPED | OUTPATIENT
Start: 2022-07-19 | End: 2022-07-29

## 2022-07-19 RX ORDER — LEVETIRACETAM 500 MG/1
500 TABLET ORAL 2 TIMES DAILY
Qty: 60 TABLET | Refills: 3 | Status: SHIPPED | OUTPATIENT
Start: 2022-07-19

## 2022-07-19 RX ORDER — NALBUPHINE HCL 10 MG/ML
10 AMPUL (ML) INJECTION ONCE
Status: COMPLETED | OUTPATIENT
Start: 2022-07-19 | End: 2022-07-19

## 2022-07-19 RX ORDER — ONDANSETRON 2 MG/ML
4 INJECTION INTRAMUSCULAR; INTRAVENOUS EVERY 30 MIN PRN
Status: DISCONTINUED | OUTPATIENT
Start: 2022-07-19 | End: 2022-07-19 | Stop reason: HOSPADM

## 2022-07-19 RX ORDER — IBUPROFEN 400 MG/1
800 TABLET ORAL ONCE
Status: DISCONTINUED | OUTPATIENT
Start: 2022-07-19 | End: 2022-07-19 | Stop reason: HOSPADM

## 2022-07-19 RX ORDER — IBUPROFEN 800 MG/1
800 TABLET ORAL EVERY 8 HOURS PRN
Qty: 20 TABLET | Refills: 0 | Status: SHIPPED | OUTPATIENT
Start: 2022-07-19 | End: 2022-07-29

## 2022-07-19 RX ORDER — SODIUM CHLORIDE 0.9 % (FLUSH) 0.9 %
5-40 SYRINGE (ML) INJECTION PRN
Status: DISCONTINUED | OUTPATIENT
Start: 2022-07-19 | End: 2022-07-19 | Stop reason: HOSPADM

## 2022-07-19 RX ORDER — SODIUM CHLORIDE 0.9 % (FLUSH) 0.9 %
5-40 SYRINGE (ML) INJECTION EVERY 12 HOURS SCHEDULED
Status: DISCONTINUED | OUTPATIENT
Start: 2022-07-19 | End: 2022-07-19 | Stop reason: HOSPADM

## 2022-07-19 RX ORDER — SODIUM CHLORIDE 9 MG/ML
INJECTION, SOLUTION INTRAVENOUS PRN
Status: DISCONTINUED | OUTPATIENT
Start: 2022-07-19 | End: 2022-07-19 | Stop reason: HOSPADM

## 2022-07-19 RX ADMIN — LEVETIRACETAM 1000 MG: 100 INJECTION, SOLUTION, CONCENTRATE INTRAVENOUS at 12:52

## 2022-07-19 RX ADMIN — IOPAMIDOL 75 ML: 755 INJECTION, SOLUTION INTRAVENOUS at 14:01

## 2022-07-19 RX ADMIN — NALBUPHINE HYDROCHLORIDE 10 MG: 10 INJECTION, SOLUTION INTRAMUSCULAR; INTRAVENOUS; SUBCUTANEOUS at 13:36

## 2022-07-19 RX ADMIN — ONDANSETRON 4 MG: 2 INJECTION INTRAMUSCULAR; INTRAVENOUS at 12:35

## 2022-07-19 RX ADMIN — SODIUM CHLORIDE 1000 ML: 9 INJECTION, SOLUTION INTRAVENOUS at 11:57

## 2022-07-19 ASSESSMENT — PAIN - FUNCTIONAL ASSESSMENT: PAIN_FUNCTIONAL_ASSESSMENT: 0-10

## 2022-07-19 ASSESSMENT — PAIN DESCRIPTION - DESCRIPTORS: DESCRIPTORS: ACHING

## 2022-07-19 ASSESSMENT — PAIN DESCRIPTION - LOCATION: LOCATION: ABDOMEN;BACK

## 2022-07-19 ASSESSMENT — PAIN SCALES - GENERAL
PAINLEVEL_OUTOF10: 9
PAINLEVEL_OUTOF10: 9

## 2022-07-19 ASSESSMENT — PAIN DESCRIPTION - ORIENTATION: ORIENTATION: RIGHT

## 2022-07-19 NOTE — ED PROVIDER NOTES
I independently examined and evaluated Isha Kennedy. In brief, 69-year-old male presenting from [facility] for possible seizure episode. Complaining of full body aches, abdominal pain, headache, neck and shoulder pain. He states that he has chronic neck and shoulder pain and was complaining of this on his last ER visit. He states he has a history of seizures as a child, and possible seizure recently on 7/2 for which he was seen in the ER, but does not take any seizure medicine. Focused exam revealed uncomfortable appearing, alert and oriented without focal neurologic deficits, no nuchal rigidity, abdominal tenderness to bilateral lower. Febrile and tachycardic. ED course: Sepsis work-up unremarkable. Normal lactate. Normal leukocytosis. Head CT without intracranial abnormality. Abdominal CT without any acute infection or pathology. Neck and shoulder pain is chronic per patient and relieved with ibuprofen. Do not suspect meningitis at this time as patient has no significant nuchal rigidity or altered mental status. COVID test positive. Suspect this time the patient's symptoms are secondary to his COVID infection. Symptoms improved after ibuprofen. Patient looks much more comfortable still remained alert, oriented and without focal neurologic deficits. Will be discharged with ibuprofen, Tylenol, and Keppra. Given strict return precautions for any chest pain, shortness of breath, or any further concerns. Has a nurse that he sees at the [facility]. Will need outpatient neuro follow up as soon as able. All diagnostic, treatment, and disposition decisions were made by myself in conjunction with the advanced practice provider. I personally saw the patient and performed a substantive portion of the visit including all aspects of the medical decision making. For all further details of the patient's emergency department visit, please see the advanced practice provider's documentation.     Comment: Please note this report has been produced using speech recognition software and may contain errors related to that system including errors in grammar, punctuation, and spelling, as well as words and phrases that may be inappropriate. If there are any questions or concerns please feel free to contact the dictating provider for clarification.         Cece Martinez MD  07/19/22 Dago Monzon MD  07/23/22 [id number]
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
## 2022-07-21 ASSESSMENT — ENCOUNTER SYMPTOMS
NAUSEA: 0
RHINORRHEA: 0
VOMITING: 0
CONSTIPATION: 0
DIARRHEA: 0
ABDOMINAL PAIN: 1
SHORTNESS OF BREATH: 0
BACK PAIN: 0
SORE THROAT: 0
COUGH: 0
EYE PAIN: 0

## 2022-07-21 NOTE — ED PROVIDER NOTES
[address] ENCOUNTER        Pt Name: Alexandra Cueva  MRN: [id number]  [place] 1995  Date of evaluation: 7/19/2022  Provider: Kimberlee Carias PA-C  PCP: No primary care provider on file. Note Started: 12:24 PM EDT       I have seen and evaluated this patient with my supervising physician Archana Larson    Triage CHIEF COMPLAINT       Chief Complaint   Patient presents with    Seizures     Witnessed per retirement nurse     Hypotension         HISTORY OF PRESENT ILLNESS   (Location/Symptom, Timing/Onset, Context/Setting, Quality, Duration, Modifying Factors, Severity)  Note limiting factors. Chief Complaint: Seizures    Alexandra Cueva is a 32 y.o. male who presents to the emergency department stating that he had a seizure. The history comes from the patient, also from the  and the nurse who took report from EMS. The seizure was witnessed by the nurse in retirement. He states that he has some pain to his head neck after the seizure, also admits to some abdominal pain. He was postictal for about 10 minutes after the seizure. Nursing Notes were all reviewed and agreed with or any disagreements were addressed in the HPI. REVIEW OF SYSTEMS    (2-9 systems for level 4, 10 or more for level 5)     Review of Systems   Constitutional:  Negative for chills, diaphoresis and fever. HENT:  Negative for congestion, ear pain, rhinorrhea and sore throat. Eyes:  Negative for pain and visual disturbance. Respiratory:  Negative for cough and shortness of breath. Cardiovascular:  Negative for chest pain, palpitations and leg swelling. Gastrointestinal:  Positive for abdominal pain. Negative for constipation, diarrhea, nausea and vomiting. Genitourinary:  Negative for decreased urine volume, dysuria, frequency and urgency. Musculoskeletal:  Negative for back pain and neck pain.    Skin:  Negative for rash and wound.   Neurological:  Negative for dizziness and light-headedness. PAST MEDICAL HISTORY   History reviewed. No pertinent past medical history. SURGICAL HISTORY   History reviewed. No pertinent surgical history. [address]       Discharge Medication List as of 7/19/2022  2:58 PM        CONTINUE these medications which have NOT CHANGED    Details   naproxen (NAPROSYN) 500 MG tablet Take 1 tablet by mouth 2 times daily as needed for Pain, Disp-20 tablet, R-0Print             ALLERGIES     Amoxicillin-pot clavulanate    FAMILYHISTORY     History reviewed. No pertinent family history. SOCIAL HISTORY       Social History     Socioeconomic History    Marital status: Single     Spouse name: None    Number of children: None    Years of education: None    Highest education level: None   Tobacco Use    Smoking status: Every Day     Packs/day: 0.50     Types: Cigarettes    Smokeless tobacco: Never   Substance and Sexual Activity    Alcohol use: Yes    Drug use: Yes     Frequency: 2.0 times per week     Types: Methamphetamines (Crystal Meth)     Comment: July 2 was the last known usage       SCREENINGS    [place] Coma Scale  Eye Opening: Spontaneous  Best Verbal Response: Oriented  Best Motor Response: Obeys commands  [place] Coma Scale Score: 15        PHYSICAL EXAM    (up to 7 for level 4, 8 or more for level 5)     ED Triage Vitals   BP Temp Temp Source Heart Rate Resp SpO2 Height Weight   07/19/22 1225 07/19/22 1146 07/19/22 1146 07/19/22 1146 07/19/22 1146 07/19/22 1146 07/19/22 1151 07/19/22 1231   (!) 135/92 (!) 101.8 °F (38.8 °C) Oral 92 23 99 % 6' (1.829 m) 180 lb (81.6 kg)       Physical Exam  Vitals and nursing note reviewed. Constitutional:       Appearance: Normal appearance. He is well-developed. He is not ill-appearing or diaphoretic. HENT:      Head: Normocephalic and atraumatic.       Right Ear: External ear normal.      Left Ear: External ear normal.      Nose: Nose normal.   Eyes: General: No scleral icterus. Right eye: No discharge. Left eye: No discharge. Extraocular Movements: Extraocular movements intact. Conjunctiva/sclera: Conjunctivae normal.      Pupils: Pupils are equal, round, and reactive to light. Cardiovascular:      Rate and Rhythm: Normal rate and regular rhythm. Heart sounds: Normal heart sounds. No murmur heard. No friction rub. No gallop. Pulmonary:      Effort: Pulmonary effort is normal. No respiratory distress. Breath sounds: Normal breath sounds. No stridor. No wheezing or rales. Chest:      Chest wall: No tenderness. Abdominal:      General: Bowel sounds are normal. There is no distension. Palpations: Abdomen is soft. There is no mass. Tenderness: There is abdominal tenderness in the right lower quadrant and suprapubic area. There is no guarding or rebound. Musculoskeletal:         General: Normal range of motion. Cervical back: Full passive range of motion without pain, normal range of motion and neck supple. No edema. Normal range of motion. Skin:     General: Skin is warm and dry. Coloration: Skin is not pale. Neurological:      General: No focal deficit present. Mental Status: He is alert and oriented to person, place, and time. He is not disoriented. GCS: GCS eye subscore is 4. GCS verbal subscore is 5. GCS motor subscore is 6. Cranial Nerves: No cranial nerve deficit. Sensory: Sensation is intact. Motor: Motor function is intact. No tremor or abnormal muscle tone. Coordination: Coordination is intact. Gait: Gait normal.      Deep Tendon Reflexes: Reflexes are normal and symmetric.    Psychiatric:         Mood and Affect: Mood normal.         Behavior: Behavior normal.       DIAGNOSTIC RESULTS   LABS:    Labs Reviewed   COVID-19, RAPID - Abnormal; Notable for the following components:       Result Value    SARS-CoV-2, NAAT DETECTED (*)     All other components within normal limits   CBC WITH AUTO DIFFERENTIAL - Abnormal; Notable for the following components:    RBC 3.91 (*)     Hemoglobin 12.0 (*)     Hematocrit 34.8 (*)     RDW 11.5 (*)     Segs Relative 85.8 (*)     Lymphocytes % 3.4 (*)     Monocytes % 8.4 (*)     All other components within normal limits   COMPREHENSIVE METABOLIC PANEL W/ REFLEX TO MG FOR LOW K - Abnormal; Notable for the following components:    Creatinine 0.8 (*)     Total Protein 6.0 (*)     All other components within normal limits   URINALYSIS WITH MICROSCOPIC - Abnormal; Notable for the following components:    Color, UA COLORLESS (*)     All other components within normal limits   CULTURE, BLOOD 1   CULTURE, BLOOD 2   LACTIC ACID   URINE DRUG SCREEN   LACTATE, SEPSIS       When ordered, only abnormal lab results are displayed. All other labs were within normal range or not returned as of this dictation. EKG: When ordered, EKG's are interpreted by the Emergency Department Physician in the absence of a cardiologist.  Please see their note for interpretation of EKG. RADIOLOGY:   Non-plain film images such as CT, Ultrasound and MRI are read by the radiologist. Plain radiographic images are visualized andpreliminarily interpreted by the  ED Provider with the below findings:        Interpretation perthe Radiologist below, if available at the time of this note:    CT ABDOMEN PELVIS W IV CONTRAST Additional Contrast? None   Final Result   Moderate to marked urinary bladder distention, which results in mild   bilateral hydronephrosis. There is mild bilateral perinephric fat stranding. Please correlate with any urinalysis evidence of urinary tract infection. Moderate to large amount of stool within the colon. Please correlate with   clinical evidence of constipation. CT HEAD WO CONTRAST   Final Result   No acute intracranial abnormality. XR CHEST PORTABLE   Final Result   No acute process.            CT HEAD WO CONTRAST    Result Date: 7/19/2022  EXAMINATION: CT OF THE HEAD WITHOUT CONTRAST  7/19/2022 1:30 pm TECHNIQUE: CT of the head was performed without the administration of intravenous contrast. Automated exposure control, iterative reconstruction, and/or weight based adjustment of the mA/kV was utilized to reduce the radiation dose to as low as reasonably achievable. COMPARISON: None. HISTORY: ORDERING SYSTEM PROVIDED HISTORY: seizure TECHNOLOGIST PROVIDED HISTORY: If patient is on cardiac monitor and/or pulse ox, they may be taken off cardiac monitor and pulse ox, left on O2 if currently on. All monitors reattached when patient returns to room. Has a \"code stroke\" or \"stroke alert\" been called? ->No Reason for exam:->seizure Decision Support Exception - unselect if not a suspected or confirmed emergency medical condition->Emergency Medical Condition (MA) Reason for Exam: Seizures; Hypotension FINDINGS: BRAIN/VENTRICLES: There is no acute intracranial hemorrhage, mass effect or midline shift. No abnormal extra-axial fluid collection. The gray-white differentiation is maintained without evidence of an acute infarct. There is no evidence of hydrocephalus. ORBITS: The visualized portion of the orbits demonstrate no acute abnormality. SINUSES: The visualized paranasal sinuses and mastoid air cells demonstrate no acute abnormality. SOFT TISSUES/SKULL:  No acute abnormality of the visualized skull or soft tissues. No acute intracranial abnormality. CT ABDOMEN PELVIS W IV CONTRAST Additional Contrast? None    Result Date: 7/19/2022  EXAMINATION: CT OF THE ABDOMEN AND PELVIS WITH CONTRAST 7/19/2022 10:30 am TECHNIQUE: CT of the abdomen and pelvis was performed with the administration of intravenous contrast. Multiplanar reformatted images are provided for review.  Automated exposure control, iterative reconstruction, and/or weight based adjustment of the mA/kV was utilized to reduce the radiation dose to as low as reasonably achievable. COMPARISON: 02/27/2020 HISTORY: ORDERING SYSTEM PROVIDED HISTORY: septic, lower abdominal pain TECHNOLOGIST PROVIDED HISTORY: Additional Contrast?->None Reason for exam:->septic, lower abdominal pain Decision Support Exception - unselect if not a suspected or confirmed emergency medical condition->Emergency Medical Condition (MA) Reason for Exam: septic, lower abdominal pain FINDINGS: Lower Chest: Mild dependent atelectasis. Base of the heart is unremarkable. Mild gynecomastia. Visualized extra thoracic soft tissues otherwise unremarkable. Evaluation is mildly limited secondary to streak artifact generated by the patient's right arm at his side. Organs: With that said, no acute hepatic abnormality. Gallbladder unremarkable. Spleen unremarkable. Adrenals unremarkable. Pancreas unremarkable. There is mild bilateral hydronephrosis, without renal or ureteral calculi detected. This dilation is most likely secondary to moderate to moderate to marked urinary bladder distention. Subtle perinephric fat stranding may be present. GI/Bowel: Moderate to large amount of stool is noted within the colon. The appendix is unremarkable. Distal esophagus and stomach unremarkable. Duodenal sweep and the remainder of the small bowel are unremarkable. Pelvis: Marked urinary bladder distention, with urinary bladder measuring nearly 18 cm in cranial caudal dimension. Prostate unremarkable. No free pelvic fluid. Peritoneum/Retroperitoneum: Abdominal aorta normal in caliber. Superior mesenteric artery is enhancing. No lymphadenopathy. Bones/Soft Tissues: Bilateral pars defects are present at L5 without anterolisthesis of L5 on S1. Moderate to marked urinary bladder distention, which results in mild bilateral hydronephrosis. There is mild bilateral perinephric fat stranding. Please correlate with any urinalysis evidence of urinary tract infection. Moderate to large amount of stool within the colon. Please correlate with clinical evidence of constipation. XR CHEST PORTABLE    Result Date: 7/19/2022  EXAMINATION: ONE XRAY VIEW OF THE CHEST 7/19/2022 11:55 am COMPARISON: 08/01/2016 HISTORY: ORDERING SYSTEM PROVIDED HISTORY: seizure TECHNOLOGIST PROVIDED HISTORY: Reason for exam:->seizure Reason for Exam: seizure Additional signs and symptoms: seizure Relevant Medical/Surgical History: seizure FINDINGS: The lungs are without acute focal process. There is no effusion or pneumothorax. The cardiomediastinal silhouette is without acute process. The osseous structures are without acute process. No acute process. PROCEDURES   Unless otherwise noted below, none     Procedures    CRITICAL CARE TIME   N/A    CONSULTS:  None      EMERGENCY DEPARTMENT COURSE and DIFFERENTIAL DIAGNOSIS/MDM:   Vitals:    Vitals:    07/19/22 1332 07/19/22 1403 07/19/22 1433 07/19/22 1504   BP: 129/66 (!) 100/44 (!) 118/56 (!) 118/56   Pulse: 84 91 (!) 104 (!) 106   Resp: 17 20 20 22   Temp:       TempSrc:       SpO2: 97% 93% 98% 99%   Weight:       Height:           Patient was given thefollowing medications:  Medications   0.9 % sodium chloride bolus (0 mLs IntraVENous Stopped 7/19/22 1338)   levETIRAcetam (KEPPRA) 1,000 mg in sodium chloride 0.9 % 100 mL IVPB (0 mg IntraVENous Stopped 7/19/22 1326)   nalbuphine (NUBAIN) injection 10 mg (10 mg IntraVENous Given 7/19/22 1336)   iopamidol (ISOVUE-370) 76 % injection 75 mL (75 mLs IntraVENous Given 7/19/22 1401)         Is this patient to be included in the SEP-1 Core Measure due to severe sepsis or septic shock? No   Exclusion criteria - the patient is NOT to be included for SEP-1 Core Measure due to:  Viral etiology found or highly suspected (including COVID-19) without concomitant bacterial infection    The differential diagnosis for the seizure does include breakthrough seizure. He is supposed to be on his medication, we will encourage close follow-up for this in prison.   We did find that the patient had COVID, febrile seizure although unlikely is also in the differential diagnosis. Patient was observed for several hours, reevaluated multiple times, the patient continued to improve. FINAL IMPRESSION      1. Seizure ([address]    2. COVID          DISPOSITION/PLAN   DISPOSITION Decision To Discharge 07/19/2022 03:04:33 PM      PATIENT REFERREDTO:  No follow-up provider specified. DISCHARGE MEDICATIONS:  Discharge Medication List as of 7/19/2022  2:58 PM        START taking these medications    Details   levETIRAcetam (KEPPRA) 500 MG tablet Take 1 tablet by mouth in the morning and 1 tablet before bedtime. , Disp-60 tablet, R-3Normal      ibuprofen (IBU) 800 MG tablet Take 1 tablet by mouth every 8 hours as needed for Pain, Disp-20 tablet, R-0Normal      acetaminophen (TYLENOL) 500 MG tablet Take 2 tablets by mouth every 6 hours as needed for Pain or Fever, Disp-60 tablet, R-0Normal             DISCONTINUED MEDICATIONS:  Discharge Medication List as of 7/19/2022  2:58 PM                 (Please note that portions ofthis note were completed with a voice recognition program.  Efforts were made to edit the dictations but occasionally words are mis-transcribed.)    Tre Cervantes PA-C (electronically signed)             Tre Cervantes PA-C  07/21/22 1910

## 2022-07-24 LAB
CULTURE: NORMAL
CULTURE: NORMAL
Lab: NORMAL
Lab: NORMAL
SPECIMEN: NORMAL
SPECIMEN: NORMAL

## 2022-11-16 ENCOUNTER — APPOINTMENT (OUTPATIENT)
Dept: GENERAL RADIOLOGY | Age: 27
End: 2022-11-16
Payer: COMMERCIAL

## 2022-11-16 ENCOUNTER — HOSPITAL ENCOUNTER (EMERGENCY)
Age: 27
Discharge: ANOTHER HOSPITAL | End: 2022-11-16
Attending: EMERGENCY MEDICINE
Payer: COMMERCIAL

## 2022-11-16 VITALS
BODY MASS INDEX: 25.73 KG/M2 | HEIGHT: 72 IN | OXYGEN SATURATION: 99 % | RESPIRATION RATE: 27 BRPM | SYSTOLIC BLOOD PRESSURE: 121 MMHG | TEMPERATURE: 97.5 F | DIASTOLIC BLOOD PRESSURE: 92 MMHG | WEIGHT: 190 LBS | HEART RATE: 109 BPM

## 2022-11-16 DIAGNOSIS — S27.0XXA TRAUMATIC PNEUMOTHORAX, INITIAL ENCOUNTER: ICD-10-CM

## 2022-11-16 DIAGNOSIS — S21.111A STAB WOUND OF RIGHT CHEST, INITIAL ENCOUNTER: Primary | ICD-10-CM

## 2022-11-16 PROCEDURE — 99285 EMERGENCY DEPT VISIT HI MDM: CPT

## 2022-11-16 PROCEDURE — 71045 X-RAY EXAM CHEST 1 VIEW: CPT

## 2022-11-16 PROCEDURE — 2580000003 HC RX 258: Performed by: EMERGENCY MEDICINE

## 2022-11-16 PROCEDURE — 6360000002 HC RX W HCPCS: Performed by: EMERGENCY MEDICINE

## 2022-11-16 PROCEDURE — 96374 THER/PROPH/DIAG INJ IV PUSH: CPT

## 2022-11-16 PROCEDURE — 32556 INSERT CATH PLEURA W/O IMAGE: CPT

## 2022-11-16 RX ORDER — FENTANYL CITRATE 50 UG/ML
INJECTION, SOLUTION INTRAMUSCULAR; INTRAVENOUS DAILY PRN
Status: COMPLETED | OUTPATIENT
Start: 2022-11-16 | End: 2022-11-16

## 2022-11-16 RX ORDER — SODIUM CHLORIDE 9 MG/ML
INJECTION, SOLUTION INTRAVENOUS CONTINUOUS PRN
Status: COMPLETED | OUTPATIENT
Start: 2022-11-16 | End: 2022-11-16

## 2022-11-16 RX ADMIN — FENTANYL CITRATE 50 MCG: 50 INJECTION, SOLUTION INTRAMUSCULAR; INTRAVENOUS at 22:58

## 2022-11-16 RX ADMIN — SODIUM CHLORIDE 125 ML/HR: 9 INJECTION, SOLUTION INTRAVENOUS at 22:45

## 2022-11-16 RX ADMIN — FENTANYL CITRATE 50 MCG: 50 INJECTION, SOLUTION INTRAMUSCULAR; INTRAVENOUS at 22:45
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
## 2022-11-18 RX ORDER — FENTANYL CITRATE 50 UG/ML
50 INJECTION, SOLUTION INTRAMUSCULAR; INTRAVENOUS ONCE
Status: ACTIVE | OUTPATIENT
Start: 2022-11-18

## 2022-12-03 ENCOUNTER — APPOINTMENT (OUTPATIENT)
Dept: GENERAL RADIOLOGY | Age: 27
End: 2022-12-03
Payer: COMMERCIAL

## 2022-12-03 ENCOUNTER — HOSPITAL ENCOUNTER (EMERGENCY)
Age: 27
Discharge: HOME | End: 2022-12-03
Attending: EMERGENCY MEDICINE
Payer: COMMERCIAL

## 2022-12-03 VITALS
SYSTOLIC BLOOD PRESSURE: 116 MMHG | OXYGEN SATURATION: 95 % | HEART RATE: 82 BPM | RESPIRATION RATE: 14 BRPM | TEMPERATURE: 98 F | DIASTOLIC BLOOD PRESSURE: 69 MMHG

## 2022-12-03 DIAGNOSIS — F19.10 POLYSUBSTANCE ABUSE (HCC): ICD-10-CM

## 2022-12-03 DIAGNOSIS — T40.601A OPIATE OVERDOSE, ACCIDENTAL OR UNINTENTIONAL, INITIAL ENCOUNTER (HCC): Primary | ICD-10-CM

## 2022-12-03 LAB
ALBUMIN SERPL-MCNC: 4.8 GM/DL (ref 3.4–5)
ALP BLD-CCNC: 87 IU/L (ref 40–129)
ALT SERPL-CCNC: 20 U/L (ref 10–40)
ANION GAP SERPL CALCULATED.3IONS-SCNC: 13 MMOL/L (ref 4–16)
AST SERPL-CCNC: 32 IU/L (ref 15–37)
BASOPHILS ABSOLUTE: 0.1 K/CU MM
BASOPHILS RELATIVE PERCENT: 0.8 % (ref 0–1)
BILIRUB SERPL-MCNC: 0.4 MG/DL (ref 0–1)
BUN BLDV-MCNC: 18 MG/DL (ref 6–23)
CALCIUM SERPL-MCNC: 9.6 MG/DL (ref 8.3–10.6)
CHLORIDE BLD-SCNC: 106 MMOL/L (ref 99–110)
CO2: 27 MMOL/L (ref 21–32)
CREAT SERPL-MCNC: 1.2 MG/DL (ref 0.9–1.3)
DIFFERENTIAL TYPE: ABNORMAL
EOSINOPHILS ABSOLUTE: 0.1 K/CU MM
EOSINOPHILS RELATIVE PERCENT: 0.9 % (ref 0–3)
GFR SERPL CREATININE-BSD FRML MDRD: >60 ML/MIN/1.73M2
GLUCOSE BLD-MCNC: 85 MG/DL (ref 70–99)
HCT VFR BLD CALC: 38.5 % (ref 42–52)
HEMOGLOBIN: 13 GM/DL (ref 13.5–18)
IMMATURE NEUTROPHIL %: 1.1 % (ref 0–0.43)
LYMPHOCYTES ABSOLUTE: 1.1 K/CU MM
LYMPHOCYTES RELATIVE PERCENT: 9.1 % (ref 24–44)
MCH RBC QN AUTO: 30.4 PG (ref 27–31)
MCHC RBC AUTO-ENTMCNC: 33.8 % (ref 32–36)
MCV RBC AUTO: 90.2 FL (ref 78–100)
MONOCYTES ABSOLUTE: 0.9 K/CU MM
MONOCYTES RELATIVE PERCENT: 7.9 % (ref 0–4)
NUCLEATED RBC %: 0 %
PDW BLD-RTO: 12.6 % (ref 11.7–14.9)
PLATELET # BLD: 327 K/CU MM (ref 140–440)
PMV BLD AUTO: 8.6 FL (ref 7.5–11.1)
POTASSIUM SERPL-SCNC: 4.5 MMOL/L (ref 3.5–5.1)
RBC # BLD: 4.27 M/CU MM (ref 4.6–6.2)
SEGMENTED NEUTROPHILS ABSOLUTE COUNT: 9.4 K/CU MM
SEGMENTED NEUTROPHILS RELATIVE PERCENT: 80.2 % (ref 36–66)
SODIUM BLD-SCNC: 146 MMOL/L (ref 135–145)
TOTAL CK: 116 IU/L (ref 38–174)
TOTAL IMMATURE NEUTOROPHIL: 0.13 K/CU MM
TOTAL NUCLEATED RBC: 0 K/CU MM
TOTAL PROTEIN: 8 GM/DL (ref 6.4–8.2)
WBC # BLD: 11.7 K/CU MM (ref 4–10.5)

## 2022-12-03 PROCEDURE — 99285 EMERGENCY DEPT VISIT HI MDM: CPT

## 2022-12-03 PROCEDURE — 82550 ASSAY OF CK (CPK): CPT

## 2022-12-03 PROCEDURE — 96360 HYDRATION IV INFUSION INIT: CPT

## 2022-12-03 PROCEDURE — 80053 COMPREHEN METABOLIC PANEL: CPT

## 2022-12-03 PROCEDURE — 93005 ELECTROCARDIOGRAM TRACING: CPT | Performed by: EMERGENCY MEDICINE

## 2022-12-03 PROCEDURE — 71045 X-RAY EXAM CHEST 1 VIEW: CPT

## 2022-12-03 PROCEDURE — 96372 THER/PROPH/DIAG INJ SC/IM: CPT

## 2022-12-03 PROCEDURE — 6360000002 HC RX W HCPCS: Performed by: EMERGENCY MEDICINE

## 2022-12-03 PROCEDURE — 85025 COMPLETE CBC W/AUTO DIFF WBC: CPT

## 2022-12-03 PROCEDURE — 2580000003 HC RX 258: Performed by: EMERGENCY MEDICINE

## 2022-12-03 RX ORDER — 0.9 % SODIUM CHLORIDE 0.9 %
1000 INTRAVENOUS SOLUTION INTRAVENOUS ONCE
Status: COMPLETED | OUTPATIENT
Start: 2022-12-03 | End: 2022-12-03

## 2022-12-03 RX ORDER — PROMETHAZINE HYDROCHLORIDE 25 MG/ML
25 INJECTION, SOLUTION INTRAMUSCULAR; INTRAVENOUS ONCE
Status: COMPLETED | OUTPATIENT
Start: 2022-12-03 | End: 2022-12-03

## 2022-12-03 RX ADMIN — SODIUM CHLORIDE 1000 ML: 9 INJECTION, SOLUTION INTRAVENOUS at 11:33

## 2022-12-03 RX ADMIN — PROMETHAZINE HYDROCHLORIDE 25 MG: 25 INJECTION INTRAMUSCULAR; INTRAVENOUS at 10:50

## 2022-12-03 NOTE — ED PROVIDER NOTES
Emergency Department Encounter  Location: [address] [place] [address] EMERGENCY DEPARTMENT    Patient: Lauren Anderson  MRN: [id number]  : 1995  Date of evaluation: 12/3/2022  ED Provider: David Augustine DO    Chief Complaint:    Drug Overdose    [employer] [facility]:  Lauren Anderson is a 32 y.o. male that presents to the emergency department after suspected overdose. Patient was given 6 mg of Narcan by EMS as he was apparently unresponsive on their arrival.  Arrives to the ED ambulatory and alert. Patient is vomiting on arrival.  States his eyes are burning. He indicates that he uses meth daily but does not routinely use opiates. Did not intentionally take opiates today or otherwise intent to harm himself. No chest pain or shortness of breath. ROS:  At least 10 systems reviewed and are acutely negative unless otherwise noted in the HPI. No past medical history on file. No past surgical history on file. No family history on file.   Social History     Socioeconomic History    Marital status: Single     Spouse name: Not on file    Number of children: Not on file    Years of education: Not on file    Highest education level: Not on file   Occupational History    Not on file   Tobacco Use    Smoking status: Every Day     Packs/day: 0.50     Types: Cigarettes    Smokeless tobacco: Never   Substance and Sexual Activity    Alcohol use: Yes    Drug use: Yes     Frequency: 2.0 times per week     Types: Methamphetamines (Crystal Meth)     Comment:  was the last known usage    Sexual activity: Not on file   Other Topics Concern    Not on file   Social History Narrative    Not on file     Social Determinants of Health     Financial Resource Strain: Not on file   Food Insecurity: Not on file   Transportation Needs: Not on file   Physical Activity: Not on file   Stress: Not on file   Social Connections: Not on file   Intimate Partner Violence: Not on file   Housing Stability: Not on file     No current facility-administered medications for this encounter. Current Outpatient Medications   Medication Sig Dispense Refill    levETIRAcetam (KEPPRA) 500 MG tablet Take 1 tablet by mouth in the morning and 1 tablet before bedtime. 60 tablet 3    ibuprofen (IBU) 800 MG tablet Take 1 tablet by mouth every 8 hours as needed for Pain 20 tablet 0    acetaminophen (TYLENOL) 500 MG tablet Take 2 tablets by mouth every 6 hours as needed for Pain or Fever 60 tablet 0    naproxen (NAPROSYN) 500 MG tablet Take 1 tablet by mouth 2 times daily as needed for Pain 20 tablet 0     Facility-Administered Medications Ordered in Other Encounters   Medication Dose Route Frequency Provider Last Rate Last Admin    fentaNYL (SUBLIMAZE) injection 50 mcg  50 mcg IntraVENous Once Kezia Soto MD         Allergies   Allergen Reactions    Amoxicillin-Pot Clavulanate        Nursing Notes Reviewed    Physical Exam:  ED Triage Vitals   Enc Vitals Group      BP       Pulse       Resp       Temp       Temp src       SpO2       Weight       Height       Head Circumference       Peak Flow       Pain Score       Pain Loc       Pain Edu? Excl. in [address]? GENERAL APPEARANCE: Awake and alert. Cooperative. Appears uncomfortable, recurrently vomiting. HEAD: Normocephalic. Atraumatic. EYES: EOM's grossly intact. Sclera anicteric. ENT: Tolerates saliva. No trismus. NECK: Supple. Trachea midline. CARDIO: Mildly tachycardic. Radial pulse 2+. LUNGS: Respirations unlabored. CTAB. ABDOMEN: Soft. Non-distended. Mildly tender through the upper abdomen. No rebound or guarding. EXTREMITIES: No acute deformities. No lower extremity tenderness, edema or asymmetry. SKIN: Warm and dry. NEUROLOGICAL: No gross facial drooping. Moves all 4 extremities spontaneously. Gait is steady. PSYCHIATRIC: Normal mood.      Labs:  Results for orders placed or performed during the hospital encounter of 12/03/22   CBC with Auto Differential   Result Value Ref Range    WBC 11.7 (H) 4.0 - 10.5 K/CU MM    RBC 4.27 (L) 4.6 - 6.2 M/CU MM    Hemoglobin 13.0 (L) 13.5 - 18.0 GM/DL    Hematocrit 38.5 (L) 42 - 52 %    MCV 90.2 78 - 100 FL    MCH 30.4 27 - 31 PG    MCHC 33.8 32.0 - 36.0 %    RDW 12.6 11.7 - 14.9 %    Platelets 935 161 - 544 K/CU MM    MPV 8.6 7.5 - 11.1 FL    Differential Type AUTOMATED DIFFERENTIAL     Segs Relative 80.2 (H) 36 - 66 %    Lymphocytes % 9.1 (L) 24 - 44 %    Monocytes % 7.9 (H) 0 - 4 %    Eosinophils % 0.9 0 - 3 %    Basophils % 0.8 0 - 1 %    Segs Absolute 9.4 K/CU MM    Lymphocytes Absolute 1.1 K/CU MM    Monocytes Absolute 0.9 K/CU MM    Eosinophils Absolute 0.1 K/CU MM    Basophils Absolute 0.1 K/CU MM    Nucleated RBC % 0.0 %    Total Nucleated RBC 0.0 K/CU MM    Total Immature Neutrophil 0.13 K/CU MM    Immature Neutrophil % 1.1 (H) 0 - 0.43 %   Comprehensive Metabolic Panel w/ Reflex to MG   Result Value Ref Range    Sodium 146 (H) 135 - 145 MMOL/L    Potassium 4.5 3.5 - 5.1 MMOL/L    Chloride 106 99 - 110 mMol/L    CO2 27 21 - 32 MMOL/L    BUN 18 6 - 23 MG/DL    Creatinine 1.2 0.9 - 1.3 MG/DL    Est, Glom Filt Rate >60 >60 mL/min/1.73m2    Glucose 85 70 - 99 MG/DL    Calcium 9.6 8.3 - 10.6 MG/DL    Albumin 4.8 3.4 - 5.0 GM/DL    Total Protein 8.0 6.4 - 8.2 GM/DL    Total Bilirubin 0.4 0.0 - 1.0 MG/DL    ALT 20 10 - 40 U/L    AST 32 15 - 37 IU/L    Alkaline Phosphatase 87 40 - 129 IU/L    Anion Gap 13 4 - 16   CK   Result Value Ref Range    Total  38 - 174 IU/L   EKG 12 Lead   Result Value Ref Range    Ventricular Rate 100 BPM    Atrial Rate 100 BPM    P-R Interval 116 ms    QRS Duration 96 ms    Q-T Interval 394 ms    QTc Calculation (Bazett) 508 ms    P Axis 74 degrees    R Axis 73 degrees    T Axis 44 degrees    Diagnosis       Normal sinus rhythm  Prolonged QT  Abnormal ECG  When compared with ECG of 27-FEB-2020 03:51,  Vent.  rate has increased BY  57 BPM  QT has lengthened  Confirmed by Dea Olivarez MD, Jerri Cavazos ([id number]) on 12/4/2022 3:17:46 PM         EKG (if obtained): (All EKG's are interpreted by myself in the absence of a cardiologist)  Sinus rhythm at 100. Normal axis with good R progression. No ST elevation or depression. No ectopy. No acute change from prior tracing. Radiographs (if obtained):  [] The following radiograph was interpreted by myself in the absence of a radiologist:  [x] Radiologist's Report reviewed at time of ED visit:  XR CHEST PORTABLE    Result Date: 12/3/2022  EXAMINATION: ONE XRAY VIEW OF THE CHEST 12/3/2022 10:45 am COMPARISON: 07/19/2022 HISTORY: ORDERING SYSTEM PROVIDED HISTORY: chest pain TECHNOLOGIST PROVIDED HISTORY: Reason for exam:->chest pain Reason for Exam: CP, overdose FINDINGS: The heart and pulmonary vascularity are within normal limits. There are no focal areas of consolidation or pleural effusion. The osseous structures are intact. Unremarkable portable exam       ED Course and MDM:  Patient is given Zofran but continued to vomit. He is given Phenergan IM followed by normal saline fluid bolus. He is much more comfortable on repeat assessment. Tachycardia resolved. While vomiting, patient began to complain of chest pain at site of recent injury. Repeat chest x-ray today is reviewed and is reassuring with no evidence of recurrent pneumothorax or effusion. EKG is nonacute as described above. Suspect patient's vomiting secondary to narcan administration. Patient denies any intent to self-harm. I think patient is appropriate for outpatient management. He is now tolerating PO. Patient is given instructions regarding symptomatic care at home as well as return precautions. To call PCP for follow up in 2-3 days. Patient verbalizes understanding of all instructions and is comfortable with the plan of care. Final Impression:  1. Opiate overdose, accidental or unintentional, initial encounter ([facility] [address]    2.  Polysubstance abuse ([facility] [address]      DISPOSITION Decision To Discharge 12/03/2022 02:22:48 PM      Patient referred to:  CLAUDIA Sauer - CNP  [place] [address] [place] [address]  [phone]    Schedule an appointment as soon as possible for a visit in 2 days      [facility] Emergency Department  [address] [place] [id number] [phone]    If symptoms worsen  Discharge medications:  Discharge Medication List as of 12/3/2022  2:31 PM        (Please note that portions of this note may have been completed with a voice recognition program. Efforts were made to edit the dictations but occasionally words are mis-transcribed.)    Alexandra Fisher,   12/05/22 9210

## 2022-12-03 NOTE — DISCHARGE INSTRUCTIONS
Resources for Assistance with Substance Abuse      Vivienne Dipika  [address], [address] [place] [address]  [phone]   [email or web address]. [facility]    Provides comprehensive treatment for alcohol and drug abuse and dependency, gender specific treatment for women and their children, and medication assisted treatment as well as case management services. Offers both intensive and non-intensive outpatient services and ambulatory detoxification for men and women, as well as non-medical residential treatment for men. Walk-in appointments are offered Monday through Thursday, starting at 11:00am on a first come-first serve basis. For all appointments - please be sure to bring proof of residency,   identification, proof of income (if applicable) and insurance card (if   applicable). (Examples include: State ID or s License with current  address or utility bill with name and current address)  Services are covered by Medicaid and many other insurance plans. Special assistance may be available for those who qualify. Locations:    Outpatient services for men @  [address] [place] [address] [facility] [address], [place]     Residential services for men @ [facility] [place], [place]    Outpatient services for women @ [address] [place], [place]    Two additional transitional housing programs for women and children    Assessments   Assessments are conducted to gather the necessary information to provide customers with the most beneficial services plan available. They can take up to two hours. Upon completion, the customer and the therapist will develop a treatment plan that meets the customers needs. Chemical Dependency Education  Designed for individuals who have had consequences related to their drug use, but do not have a dependency diagnosis. The program meets once a week for two hours for four weeks. It teaches about the emotional, physical and mental aspects related substance use and abuse.  It also provides information and techniques to improve coping skills. Outpatient Treatment  Consists of a combination of services that total less than 9 hours per week. This program is typically designed for individuals who have had previous treatment and do not need intensive services and structure to assist with maintaining abstinence. Intensive Outpatient Treatment   Consists of 9 hours of services or more on a weekly basis designed to provide structure and in-depth counseling and information to help clients stabilize in an outpatient setting. Family Dependency Drug Court  William Hernandez in conjunction with Juvenile Court and Family and Childrens Services operate a [address] [place] [address] Program for clients who are at risk of losing custody of their children due to their substance use. Substance Abuse Violence Intervention  Offers an intensive program designed specifically for domestic violence offenders with substance abuse histories. The [facility] projects primary goal is to reduce the incidence of domestic violence by working hand-in-hand with community social service agencies, law enforcement, domestic violence offenders, and when appropriate, their families. The treatment is certified through [facility]    Criminal Justice Programs  William Hernandez has a FPC therapist and  located in the On license of [facility]. Judges and probation officers rely on the therapist to accurately assess and make recommendations for treatment for those individuals who are incarcerated in the FPC. Referrals are often made directly from FPC to residential treatment for both men and women. William Hernandez also has one therapist working within the [facility]. Substance Abuse treatment services are provided for ex-offenders returning to [facility] to assist with facilitating a successful transition back into the community.     In addition to the services listed above, Hilda Grahamjacquie offers transitional housing for women and their children. For more information regarding transitional housing call [phone] ext. [phone]. Susan Dailey [address]72    Offers treatment for alcohol, drug, and tobacco addiction                      Families of [facility] ([address] [place] [address])  Hector  of Support  [facility], [address]  http://www. foafamilies. org  [email or web address]@[employer]. com   [phone]) [phone]    A non-profit organization in [facility] that educates, empowers and embraces families, friends and individuals struggling with addiction to rebuild families and transform lives. Works to reduce the stigma of addiction, ensure availability of adequate treatment and recovery support services, as well as to influence public opinion and policy regarding the value of recovery. Holds weekly support meetings where families and individuals affected by addiction can come for support, friendship and education. The sharing of our experiences, strength and hope offer a pathway to peace. [facility] of [facility] meets weekly on Tuesday evenings (6:30 to 8 pm) at [facility]. [facility] [facility],[address]  [facility], [address] [place] [address]  [phone]    A transitional living center for men in early stages of substance abuse recovery. Purpose is to provide safe clean housing in a sober environment for men committed to their recovery. Assists residents in finding employment when necessary. Mental Health Services for [facility] and [address] [place] [address] a comprehensive array of mental health counseling and psychiatric services to adults, youth and children, including inpatient hospitalization. Limited primary health care is available to eligible clients. Provides alcohol and drug treatment services for adolescents. Adult Services  [address]  [address] [place], [place] [address]  [phone]  www. [facility]  [address], [address] [facility]  [address] [address] [facility]  [phone]        For additional information and referrals, dial 2-1-1. Residents can obtain information at 2-1-1 about hospitals, doctors, the nearest food pantry, utility services, or a variety of other types of resources. The number is available for non-emergency assistance 24 hours a day.   Alternate Numbers:    [facility]:  (179) [phone]    [facility]:  [phone]    [facility]:  [phone]    Toll-Free:  [phone]

## 2022-12-04 LAB
EKG ATRIAL RATE: 100 BPM
EKG DIAGNOSIS: NORMAL
EKG P AXIS: 74 DEGREES
EKG P-R INTERVAL: 116 MS
EKG Q-T INTERVAL: 394 MS
EKG QRS DURATION: 96 MS
EKG QTC CALCULATION (BAZETT): 508 MS
EKG R AXIS: 73 DEGREES
EKG T AXIS: 44 DEGREES
EKG VENTRICULAR RATE: 100 BPM

## 2022-12-04 PROCEDURE — 93010 ELECTROCARDIOGRAM REPORT: CPT | Performed by: INTERNAL MEDICINE

## 2023-02-27 ENCOUNTER — HOSPITAL ENCOUNTER (EMERGENCY)
Age: 28
Discharge: HOME | End: 2023-02-27
Attending: EMERGENCY MEDICINE
Payer: COMMERCIAL

## 2023-02-27 ENCOUNTER — APPOINTMENT (OUTPATIENT)
Dept: GENERAL RADIOLOGY | Age: 28
End: 2023-02-27
Payer: COMMERCIAL

## 2023-02-27 VITALS
BODY MASS INDEX: 23.7 KG/M2 | WEIGHT: 175 LBS | OXYGEN SATURATION: 96 % | RESPIRATION RATE: 18 BRPM | HEART RATE: 79 BPM | HEIGHT: 72 IN | SYSTOLIC BLOOD PRESSURE: 103 MMHG | TEMPERATURE: 97.6 F | DIASTOLIC BLOOD PRESSURE: 66 MMHG

## 2023-02-27 DIAGNOSIS — M79.672 PAIN IN BOTH FEET: ICD-10-CM

## 2023-02-27 DIAGNOSIS — M79.671 PAIN IN BOTH FEET: ICD-10-CM

## 2023-02-27 DIAGNOSIS — F19.90 ACTIVE INTRAVENOUS DRUG USE: ICD-10-CM

## 2023-02-27 DIAGNOSIS — L03.113 CELLULITIS OF RIGHT UPPER EXTREMITY: ICD-10-CM

## 2023-02-27 DIAGNOSIS — R07.9 CHEST PAIN, UNSPECIFIED TYPE: Primary | ICD-10-CM

## 2023-02-27 LAB
ALBUMIN SERPL-MCNC: 4.3 GM/DL (ref 3.4–5)
ALP BLD-CCNC: 81 IU/L (ref 40–129)
ALT SERPL-CCNC: 11 U/L (ref 10–40)
ANION GAP SERPL CALCULATED.3IONS-SCNC: 9 MMOL/L (ref 4–16)
AST SERPL-CCNC: 17 IU/L (ref 15–37)
BASOPHILS ABSOLUTE: 0.1 K/CU MM
BASOPHILS RELATIVE PERCENT: 0.6 % (ref 0–1)
BILIRUB SERPL-MCNC: 0.5 MG/DL (ref 0–1)
BILIRUBIN DIRECT: 0.2 MG/DL (ref 0–0.3)
BILIRUBIN, INDIRECT: 0.3 MG/DL (ref 0–0.7)
BUN SERPL-MCNC: 13 MG/DL (ref 6–23)
CALCIUM SERPL-MCNC: 9.3 MG/DL (ref 8.3–10.6)
CHLORIDE BLD-SCNC: 101 MMOL/L (ref 99–110)
CO2: 25 MMOL/L (ref 21–32)
CREAT SERPL-MCNC: 1 MG/DL (ref 0.9–1.3)
DIFFERENTIAL TYPE: ABNORMAL
EKG ATRIAL RATE: 101 BPM
EKG DIAGNOSIS: NORMAL
EKG P AXIS: 86 DEGREES
EKG P-R INTERVAL: 132 MS
EKG Q-T INTERVAL: 356 MS
EKG QRS DURATION: 94 MS
EKG QTC CALCULATION (BAZETT): 461 MS
EKG R AXIS: 85 DEGREES
EKG T AXIS: 62 DEGREES
EKG VENTRICULAR RATE: 101 BPM
EOSINOPHILS ABSOLUTE: 0.1 K/CU MM
EOSINOPHILS RELATIVE PERCENT: 1.5 % (ref 0–3)
GFR SERPL CREATININE-BSD FRML MDRD: >60 ML/MIN/1.73M2
GLUCOSE SERPL-MCNC: 91 MG/DL (ref 70–99)
HCT VFR BLD CALC: 38.3 % (ref 42–52)
HEMOGLOBIN: 12.9 GM/DL (ref 13.5–18)
IMMATURE NEUTROPHIL %: 0.4 % (ref 0–0.43)
LIPASE: 13 IU/L (ref 13–60)
LYMPHOCYTES ABSOLUTE: 1.4 K/CU MM
LYMPHOCYTES RELATIVE PERCENT: 18.3 % (ref 24–44)
MAGNESIUM: 2.1 MG/DL (ref 1.8–2.4)
MCH RBC QN AUTO: 29.3 PG (ref 27–31)
MCHC RBC AUTO-ENTMCNC: 33.7 % (ref 32–36)
MCV RBC AUTO: 87 FL (ref 78–100)
MONOCYTES ABSOLUTE: 0.8 K/CU MM
MONOCYTES RELATIVE PERCENT: 9.8 % (ref 0–4)
NUCLEATED RBC %: 0 %
PDW BLD-RTO: 11.6 % (ref 11.7–14.9)
PLATELET # BLD: 179 K/CU MM (ref 140–440)
PMV BLD AUTO: 9.2 FL (ref 7.5–11.1)
POTASSIUM SERPL-SCNC: 3.8 MMOL/L (ref 3.5–5.1)
RBC # BLD: 4.4 M/CU MM (ref 4.6–6.2)
SEGMENTED NEUTROPHILS ABSOLUTE COUNT: 5.4 K/CU MM
SEGMENTED NEUTROPHILS RELATIVE PERCENT: 69.4 % (ref 36–66)
SODIUM BLD-SCNC: 135 MMOL/L (ref 135–145)
TOTAL IMMATURE NEUTOROPHIL: 0.03 K/CU MM
TOTAL NUCLEATED RBC: 0 K/CU MM
TOTAL PROTEIN: 7 GM/DL (ref 6.4–8.2)
TROPONIN T: <0.01 NG/ML
WBC # BLD: 7.8 K/CU MM (ref 4–10.5)

## 2023-02-27 PROCEDURE — 80053 COMPREHEN METABOLIC PANEL: CPT

## 2023-02-27 PROCEDURE — 82248 BILIRUBIN DIRECT: CPT

## 2023-02-27 PROCEDURE — 71046 X-RAY EXAM CHEST 2 VIEWS: CPT

## 2023-02-27 PROCEDURE — 93010 ELECTROCARDIOGRAM REPORT: CPT | Performed by: INTERNAL MEDICINE

## 2023-02-27 PROCEDURE — 84484 ASSAY OF TROPONIN QUANT: CPT

## 2023-02-27 PROCEDURE — 99285 EMERGENCY DEPT VISIT HI MDM: CPT

## 2023-02-27 PROCEDURE — 93005 ELECTROCARDIOGRAM TRACING: CPT | Performed by: EMERGENCY MEDICINE

## 2023-02-27 PROCEDURE — 85025 COMPLETE CBC W/AUTO DIFF WBC: CPT

## 2023-02-27 PROCEDURE — 83735 ASSAY OF MAGNESIUM: CPT

## 2023-02-27 PROCEDURE — 6370000000 HC RX 637 (ALT 250 FOR IP): Performed by: EMERGENCY MEDICINE

## 2023-02-27 PROCEDURE — 83690 ASSAY OF LIPASE: CPT

## 2023-02-27 RX ORDER — DOXYCYCLINE HYCLATE 100 MG
100 TABLET ORAL ONCE
Status: COMPLETED | OUTPATIENT
Start: 2023-02-27 | End: 2023-02-27

## 2023-02-27 RX ORDER — DOXYCYCLINE HYCLATE 100 MG
100 TABLET ORAL 2 TIMES DAILY
Qty: 14 TABLET | Refills: 0 | Status: SHIPPED | OUTPATIENT
Start: 2023-02-27 | End: 2023-03-06

## 2023-02-27 RX ADMIN — DOXYCYCLINE HYCLATE 100 MG: 100 TABLET, COATED ORAL at 02:03

## 2023-02-27 NOTE — CARE COORDINATION
CM informed that patient walked to ED barefoot. Kimberly Delgado from ED gave patient a pair of socks and wanted to know if CM could assist with shoes. CM went and introduced self to patient. Patient wears a size 13 and CM unable to accommodate any shoes for patient. However; CM did give patient a coat and provided a ride home upon discharge with [employer] @ [phone]. Invoice completed.

## 2023-02-27 NOTE — ED PROVIDER NOTES
CHIEF COMPLAINT    Chief Complaint   Patient presents with    Chest Pain     HPI  Aly Bonilla is a 32 y.o. male history of seizure disorder who presents to the ED with complaints of chest pain and foot/ankle swelling with pain. Patient states over the last week or so he has been experiencing increasing pain to right side of chest with radiation to his neck. Does have previous stabbing to right chest with hemopneumothorax in November 2022 requiring transfer from this facility to [facility] with chest tube placement. Patient states that since that time he has continued to experience some right-sided chest pain with certain exertional movements. He states no new trauma to the area. Pain is currently described as a throbbing stabbing pain rated as moderate to severe. Pain exacerbated certain movements and palpation as well as deep inspiration. Nothing makes it better pain does not radiate from the right side of the chest.  He is also concerned about some swelling and pain to bilateral ankles and feet present this past week although he feels the swelling has improved but pain continues with ambulation as well as movement of the feet. This pain is described as a burning and stabbing pain exacerbated with palpation and weightbearing. Denies fevers, chills, nausea, vomiting, dizziness, lightheadedness, nausea, vomiting      REVIEW OF SYSTEMS  Constitutional: No fever, chills or recent illness. Eye: No visual changes  HENT: No earache or sore throat. Resp: No SOB or productive cough. Cardio: Complains of right-sided chest  GI: No abdominal pain, nausea, vomiting, constipation or diarrhea. No melena. : No dysuria, urgency or frequency. Endocrine: No heat intolerance, no cold intolerance, no polydipsia   Lymphatics: No adenopathy  Musculoskeletal: Complains of pain and swelling to bilateral ankles and feet  Neuro: No headaches.   Psych: No homicidal or suicidal thoughts  Skin: No rash, No itching. ?  ? PAST MEDICAL HISTORY  Past Medical History:   Diagnosis Date    Bipolar 1 disorder ([facility] [address]     [facility]      FAMILY HISTORY  History reviewed. No pertinent family history. SOCIAL HISTORY  Social History     Socioeconomic History    Marital status: Single     Spouse name: None    Number of children: None    Years of education: None    Highest education level: None   Tobacco Use    Smoking status: Every Day     Packs/day: 0.50     Types: Cigarettes    Smokeless tobacco: Never   Substance and Sexual Activity    Alcohol use: Yes     Comment: occasionally    Drug use: Not Currently     Types: Methamphetamines (Crystal Meth)     Comment: July 2 was the last known usage       SURGICAL HISTORY  Past Surgical History:   Procedure Laterality Date    CHEST TUBE INSERTION Right     pneumo in November 2022     CURRENT MEDICATIONS  Previous Medications    ACETAMINOPHEN (TYLENOL) 500 MG TABLET    Take 2 tablets by mouth every 6 hours as needed for Pain or Fever    IBUPROFEN (IBU) 800 MG TABLET    Take 1 tablet by mouth every 8 hours as needed for Pain    LEVETIRACETAM (KEPPRA) 500 MG TABLET    Take 1 tablet by mouth in the morning and 1 tablet before bedtime. NAPROXEN (NAPROSYN) 500 MG TABLET    Take 1 tablet by mouth 2 times daily as needed for Pain     ALLERGIES  Allergies   Allergen Reactions    Amoxicillin-Pot Clavulanate        Nursing notes reviewed by myself for past medical history, family history, social history, surgical history, current medications, and allergies. PHYSICAL EXAM  VITAL SIGNS: Triage VS:    ED Triage Vitals [02/27/23 0035]   Enc Vitals Group      /82      Heart Rate 97      Resp 18      Temp 97.6 °F (36.4 °C)      Temp Source Oral      SpO2 100 %      Weight 175 lb (79.4 kg)      Height       Head Circumference       Peak Flow       Pain Score       Pain Loc       Pain Edu? Excl. in [address]?       Constitutional: Well developed, Well nourished, nontoxic-appearing  HENT: Normocephalic, Atraumatic, Bilateral external ears normal, Oropharynx moist, No oral exudates, Nose normal.   Eyes: PERRL, EOMI, Conjunctiva normal, No discharge. No scleral icterus. Neck: Normal range of motion, No tenderness, Supple. Lymphatic: No lymphadenopathy noted. Cardiovascular: Normal heart rate, Normal rhythm, No murmurs, gallops or rubs. Thorax & Lungs: Normal breath sounds, No respiratory distress, No wheezing. Tenderness to palpation of right lateral chest without crepitus or step off  Abdomen: Soft, No tenderness, No masses, No pulsatile masses, No distention, Normal bowel sounds  Skin: Warm, Dry  Back: No tenderness, No CVA tenderness. Extremities: Dirt covering plantar aspect of bilateral feet and abrasion to 4th toe of right foot, full range of motion to flexion extension of all toes as well as to plantarflexion dorsiflexion of bilateral ankles, patient has erythema to flexor surface of right upper arm with palpable induration and tenderness to palpation but no fluctuance, no cyanosis, Normal perfusion, No clubbing. Musculoskeletal: Good range of motion in all major joints as observed. No major deformities noted. Neurologic: Alert & oriented x 3, No focal deficits noted. Psychiatric: Affect normal, Judgment normal, Mood normal.     RADIOLOGY  Labs Reviewed   CBC WITH AUTO DIFFERENTIAL - Abnormal; Notable for the following components:       Result Value    RBC 4.40 (*)     Hemoglobin 12.9 (*)     Hematocrit 38.3 (*)     RDW 11.6 (*)     Segs Relative 69.4 (*)     Lymphocytes % 18.3 (*)     Monocytes % 9.8 (*)     All other components within normal limits   BASIC METABOLIC PANEL   HEPATIC FUNCTION PANEL   MAGNESIUM   TROPONIN   LIPASE     I personally reviewed the images. The radiologist's interpretation reveals:  Last Imaging results   XR CHEST (2 VW)   Final Result   No acute cardiopulmonary abnormality.              MEDS GIVEN IN ED:  Medications   doxycycline hyclate (VIBRA-TABS) tablet 100 mg (has no administration in time range)     COURSE & MEDICAL DECISION MAKING  71-year-old male presents emergency department with complaints of right-sided chest pain as well as pain and swelling to bilateral ankles and feet. Initial vital signs are reassuring. He is saturating 1 her percent room air. His lungs are clear to auscultation bilaterally. He does have some reproducible tenderness palpation over the right lateral chest without crepitus. He has abrasion noted to fourth toe of right foot and dirt covering the feet. No appreciable edema with 2+ dorsalis pedis and posterior tibial pulses. He has full range of motion to plantarflexion and dorsiflexion of bilateral lower extremities. Patient was initially asked about drug use which he denied. However, during his examination he was noted to have an area of erythema and induration to right upper arm without palpable fluctuance and at this time admitted to injection of what he believed to be IV methamphetamine yesterday. At this time we will obtain chest x-ray, CBC, BMP, hepatic panel, lipase, magnesium level. Patient was offered pain medication which he declined. Patient's chest x-ray is without acute cardiopulmonary pathology, specifically there is no evidence of pneumothorax. EG is without acute ischemic changes or dysrhythmia. Troponin is nonelevated. CBC is without leukocytosis. BMP, hepatic panel, magnesium level are reassuring. At this time I feel patient is appropriate for discharge home given reassuring evaluation here. A dose of doxycycline ordered for treatment of cellulitis to right upper extremity. Discharged with a prescription for the same.   Discharge restrict return precautions        Amount and/or Complexity of Data Reviewed  Clinical lab tests: reviewed  Decide to obtain previous medical records or to obtain history from someone other than the patient: yes       -  Patient seen and evaluated in the emergency department.  -  Triage and nursing notes reviewed and incorporated.  -  Old chart records reviewed and incorporated.  -  Work-up included:  See above      Appropriate PPE utilized as indicated for entire patient encounter?  Time of Disposition: See timeline      Independent Imaging Interpretation by me: Chest x-ray interpreted myself as well as with radiology over read     EKG (if obtained): Please see my above interpretation     Chronic conditions affecting care: IV drug abuse, previous traumatic pneumothorax     Discussion with Other Profesionals : None       Social Determinants : IV drug abuse         I am the Primary Clinician of Record.    ?  New Prescriptions    DOXYCYCLINE HYCLATE (VIBRA-TABS) 100 MG TABLET    Take 1 tablet by mouth 2 times daily for 7 days     FINAL IMPRESSION  1. Chest pain, unspecified type    2. Pain in both feet    3. Cellulitis of right upper extremity    4. Active intravenous drug use        Electronically signed by: Subhash Rosado DO, 2/27/2023         Subhash Rosado DO  02/27/23 0158

## 2023-02-27 NOTE — ED NOTES
Pt discharged from emergency department with all necessary paperwork and scripts. Discharge information reviewed and all questions answered.           Tano Veras RN  02/27/23 9348

## 2023-08-15 ENCOUNTER — APPOINTMENT (OUTPATIENT)
Dept: CT IMAGING | Age: 28
End: 2023-08-15
Payer: COMMERCIAL

## 2023-08-15 ENCOUNTER — HOSPITAL ENCOUNTER (EMERGENCY)
Age: 28
Discharge: ANOTHER HOSPITAL | End: 2023-08-15
Attending: EMERGENCY MEDICINE
Payer: COMMERCIAL

## 2023-08-15 VITALS
DIASTOLIC BLOOD PRESSURE: 94 MMHG | RESPIRATION RATE: 16 BRPM | HEIGHT: 72 IN | OXYGEN SATURATION: 96 % | SYSTOLIC BLOOD PRESSURE: 147 MMHG | HEART RATE: 88 BPM | WEIGHT: 205 LBS | BODY MASS INDEX: 27.77 KG/M2 | TEMPERATURE: 98.3 F

## 2023-08-15 DIAGNOSIS — S02.31XA CLOSED FRACTURE OF RIGHT ORBITAL FLOOR, INITIAL ENCOUNTER (HCC): Primary | ICD-10-CM

## 2023-08-15 DIAGNOSIS — S02.40CA CLOSED FRACTURE OF RIGHT SIDE OF MAXILLA, INITIAL ENCOUNTER (HCC): ICD-10-CM

## 2023-08-15 PROCEDURE — 96375 TX/PRO/DX INJ NEW DRUG ADDON: CPT

## 2023-08-15 PROCEDURE — 70486 CT MAXILLOFACIAL W/O DYE: CPT

## 2023-08-15 PROCEDURE — 6370000000 HC RX 637 (ALT 250 FOR IP): Performed by: PHYSICIAN ASSISTANT

## 2023-08-15 PROCEDURE — 6360000002 HC RX W HCPCS: Performed by: PHYSICIAN ASSISTANT

## 2023-08-15 PROCEDURE — 90714 TD VACC NO PRESV 7 YRS+ IM: CPT | Performed by: PHYSICIAN ASSISTANT

## 2023-08-15 PROCEDURE — 70450 CT HEAD/BRAIN W/O DYE: CPT

## 2023-08-15 PROCEDURE — 96365 THER/PROPH/DIAG IV INF INIT: CPT

## 2023-08-15 PROCEDURE — 99285 EMERGENCY DEPT VISIT HI MDM: CPT

## 2023-08-15 PROCEDURE — 6360000002 HC RX W HCPCS: Performed by: EMERGENCY MEDICINE

## 2023-08-15 PROCEDURE — 90471 IMMUNIZATION ADMIN: CPT | Performed by: PHYSICIAN ASSISTANT

## 2023-08-15 RX ORDER — ACETAMINOPHEN 500 MG
1000 TABLET ORAL ONCE
Status: COMPLETED | OUTPATIENT
Start: 2023-08-15 | End: 2023-08-15

## 2023-08-15 RX ORDER — CLINDAMYCIN PHOSPHATE 300 MG/50ML
600 INJECTION INTRAVENOUS
Status: DISPENSED | OUTPATIENT
Start: 2023-08-15 | End: 2023-08-15

## 2023-08-15 RX ORDER — TETANUS AND DIPHTHERIA TOXOIDS ADSORBED 2; 2 [LF]/.5ML; [LF]/.5ML
0.5 INJECTION INTRAMUSCULAR ONCE
Status: COMPLETED | OUTPATIENT
Start: 2023-08-15 | End: 2023-08-15

## 2023-08-15 RX ORDER — MORPHINE SULFATE 4 MG/ML
4 INJECTION, SOLUTION INTRAMUSCULAR; INTRAVENOUS EVERY 30 MIN PRN
Status: DISCONTINUED | OUTPATIENT
Start: 2023-08-15 | End: 2023-08-15 | Stop reason: HOSPADM

## 2023-08-15 RX ADMIN — TETANUS AND DIPHTHERIA TOXOIDS ADSORBED 0.5 ML: 2; 2 INJECTION INTRAMUSCULAR at 14:46

## 2023-08-15 RX ADMIN — CLINDAMYCIN PHOSPHATE 600 MG: 300 INJECTION, SOLUTION INTRAVENOUS at 14:22

## 2023-08-15 RX ADMIN — MORPHINE SULFATE 4 MG: 4 INJECTION, SOLUTION INTRAMUSCULAR; INTRAVENOUS at 15:16

## 2023-08-15 RX ADMIN — ACETAMINOPHEN 1000 MG: 500 TABLET ORAL at 14:46

## 2023-08-15 ASSESSMENT — PAIN SCALES - GENERAL: PAINLEVEL_OUTOF10: 10

## 2023-08-15 ASSESSMENT — PAIN DESCRIPTION - LOCATION: LOCATION: FACE

## 2023-08-15 NOTE — ACP (ADVANCE CARE PLANNING)
Patient does not have any ACP documents/Medical Power of . LSW notes hospital will follow [place]'s Next of Kin hierarchy in the following descending order for priority:    Guardian  Spouse  Majority of adult Children  Parents  Majority of adult Siblings  Nearest Relative not described above    Per [place] Next of Kin hierarchy: Patients' grandparent will be [address] [facility].
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
## 2024-06-07 ENCOUNTER — HOSPITAL ENCOUNTER (EMERGENCY)
Age: 29
Discharge: HOME | End: 2024-06-07
Attending: EMERGENCY MEDICINE
Payer: COMMERCIAL

## 2024-06-07 VITALS
RESPIRATION RATE: 16 BRPM | BODY MASS INDEX: 32.61 KG/M2 | OXYGEN SATURATION: 100 % | SYSTOLIC BLOOD PRESSURE: 144 MMHG | WEIGHT: 240.74 LBS | DIASTOLIC BLOOD PRESSURE: 100 MMHG | HEIGHT: 72 IN | HEART RATE: 70 BPM

## 2024-06-07 DIAGNOSIS — T50.901A ACCIDENTAL OVERDOSE, INITIAL ENCOUNTER: Primary | ICD-10-CM

## 2024-06-07 PROCEDURE — 99283 EMERGENCY DEPT VISIT LOW MDM: CPT

## 2024-06-07 RX ORDER — NALOXONE HYDROCHLORIDE 4 MG/.1ML
1 SPRAY NASAL PRN
Qty: 1 EACH | Refills: 0 | Status: SHIPPED | OUTPATIENT
Start: 2024-06-07

## 2024-06-07 ASSESSMENT — LIFESTYLE VARIABLES
HOW OFTEN DO YOU HAVE A DRINK CONTAINING ALCOHOL: MONTHLY OR LESS
HOW MANY STANDARD DRINKS CONTAINING ALCOHOL DO YOU HAVE ON A TYPICAL DAY: 3 OR 4

## 2024-06-07 ASSESSMENT — PAIN DESCRIPTION - LOCATION: LOCATION: HEAD

## 2024-06-07 ASSESSMENT — PAIN - FUNCTIONAL ASSESSMENT
PAIN_FUNCTIONAL_ASSESSMENT: NONE - DENIES PAIN
PAIN_FUNCTIONAL_ASSESSMENT: 0-10

## 2024-06-07 ASSESSMENT — ENCOUNTER SYMPTOMS
RHINORRHEA: 0
SHORTNESS OF BREATH: 0
ABDOMINAL PAIN: 0
EYE REDNESS: 0
SORE THROAT: 0

## 2024-06-07 ASSESSMENT — PAIN DESCRIPTION - FREQUENCY: FREQUENCY: CONTINUOUS

## 2024-06-07 ASSESSMENT — PAIN DESCRIPTION - PAIN TYPE: TYPE: ACUTE PAIN

## 2024-06-07 ASSESSMENT — PAIN DESCRIPTION - DESCRIPTORS: DESCRIPTORS: POUNDING;THROBBING

## 2024-06-07 ASSESSMENT — PAIN SCALES - GENERAL: PAINLEVEL_OUTOF10: 7

## 2024-06-07 NOTE — ED TRIAGE NOTES
Pt arrives via ems for eval of drug overdose, pt sts he shot 1g of fentanyl,, narcan 1741 2mg, pt is a/o at this time.  Pt is a/ox4, rsp nonlabored and skin race appropriate,warm and dry.

## 2024-06-07 NOTE — ED PROVIDER NOTES
[facility] EMERGENCY DEPARTMENT     EMERGENCY DEPARTMENT ENCOUNTER            Pt Name: Moises Ponce   MRN: [id number]   Birthdate 1995   Date of evaluation: 6/7/2024   Provider: Lemuel Black MD   PCP: No primary care provider on file.   Note Started: 6:22 PM EDT 6/7/24          CHIEF COMPLAINT     Chief Complaint   Patient presents with    Drug Overdose     Pt arrives via ems for eval of drug overdose, pt sts he shot 1g of fentanyl,, narcan 1741 2mg, pt is a/o at this time.             HISTORY OF PRESENT ILLNESS:   History from : Patient   Limitations to history : None     Moises Ponce is a 29 y.o. male who presents after an accidental overdose on fentanyl.  The patient states that he has not used fentanyl in a long time.  He also states he thought he was shooting methamphetamine.  He believes his girlfriend called 911.  EMS reports that they gave the patient Narcan x 1 and the patient woke up.  The patient is currently without any complaints.  He denies any chest pain, shortness of breath, headache, rash.  He denies trying to hurt himself.    Nursing Notes were all reviewed and agreed with, or any disagreements were addressed in the HPI.     REVIEW OF SYSTEMS :    Review of Systems   Constitutional:  Negative for fever.   HENT:  Negative for rhinorrhea and sore throat.    Eyes:  Negative for redness.   Respiratory:  Negative for shortness of breath.    Cardiovascular:  Negative for chest pain.   Gastrointestinal:  Negative for abdominal pain.   Genitourinary:  Negative for flank pain.   Neurological:  Negative for headaches.   Hematological:  Negative for adenopathy.   Psychiatric/Behavioral:  Negative for confusion.         MEDICAL HISTORY   has a past medical history of Bipolar 1 disorder (HCC) and Seizures (HCC).    Past Surgical History:   Procedure Laterality Date    CHEST TUBE INSERTION Right     pneumo in November 2022      CURRENTMEDICATIONS

## 2024-07-08 ENCOUNTER — APPOINTMENT (OUTPATIENT)
Dept: GENERAL RADIOLOGY | Age: 29
End: 2024-07-08
Payer: COMMERCIAL

## 2024-07-08 ENCOUNTER — HOSPITAL ENCOUNTER (EMERGENCY)
Age: 29
Discharge: HOME | End: 2024-07-08
Payer: COMMERCIAL

## 2024-07-08 VITALS
DIASTOLIC BLOOD PRESSURE: 79 MMHG | RESPIRATION RATE: 15 BRPM | HEIGHT: 72 IN | OXYGEN SATURATION: 95 % | TEMPERATURE: 97.2 F | WEIGHT: 244.49 LBS | BODY MASS INDEX: 33.12 KG/M2 | SYSTOLIC BLOOD PRESSURE: 126 MMHG | HEART RATE: 92 BPM

## 2024-07-08 DIAGNOSIS — T14.8XXA WOUND OF SKIN: Primary | ICD-10-CM

## 2024-07-08 LAB
ALBUMIN SERPL-MCNC: 4.2 G/DL (ref 3.4–5)
ALBUMIN/GLOB SERPL: 1.4 {RATIO} (ref 1.1–2.2)
ALP SERPL-CCNC: 75 U/L (ref 40–129)
ALT SERPL-CCNC: 18 U/L (ref 10–40)
ANION GAP SERPL CALCULATED.3IONS-SCNC: 14 MMOL/L (ref 3–16)
AST SERPL-CCNC: 16 U/L (ref 15–37)
BASOPHILS # BLD: 0 K/UL (ref 0–0.2)
BASOPHILS NFR BLD: 0.8 %
BILIRUB SERPL-MCNC: 0.5 MG/DL (ref 0–1)
BUN SERPL-MCNC: 16 MG/DL (ref 7–20)
CALCIUM SERPL-MCNC: 9.2 MG/DL (ref 8.3–10.6)
CHLORIDE SERPL-SCNC: 103 MMOL/L (ref 99–110)
CO2 SERPL-SCNC: 22 MMOL/L (ref 21–32)
CREAT SERPL-MCNC: 0.9 MG/DL (ref 0.9–1.3)
DEPRECATED RDW RBC AUTO: 13.3 % (ref 12.4–15.4)
EOSINOPHIL # BLD: 0.1 K/UL (ref 0–0.6)
EOSINOPHIL NFR BLD: 2 %
GFR SERPLBLD CREATININE-BSD FMLA CKD-EPI: >90 ML/MIN/{1.73_M2}
GLUCOSE SERPL-MCNC: 96 MG/DL (ref 70–99)
HCT VFR BLD AUTO: 39.3 % (ref 40.5–52.5)
HGB BLD-MCNC: 13.7 G/DL (ref 13.5–17.5)
LACTATE BLDV-SCNC: 1.3 MMOL/L (ref 0.4–2)
LYMPHOCYTES # BLD: 1.4 K/UL (ref 1–5.1)
LYMPHOCYTES NFR BLD: 26.7 %
MCH RBC QN AUTO: 30.2 PG (ref 26–34)
MCHC RBC AUTO-ENTMCNC: 34.9 G/DL (ref 31–36)
MCV RBC AUTO: 86.6 FL (ref 80–100)
MONOCYTES # BLD: 0.7 K/UL (ref 0–1.3)
MONOCYTES NFR BLD: 13.8 %
NEUTROPHILS # BLD: 3 K/UL (ref 1.7–7.7)
NEUTROPHILS NFR BLD: 56.7 %
PLATELET # BLD AUTO: 209 K/UL (ref 135–450)
PMV BLD AUTO: 7.5 FL (ref 5–10.5)
POTASSIUM SERPL-SCNC: 4.1 MMOL/L (ref 3.5–5.1)
PROT SERPL-MCNC: 7.2 G/DL (ref 6.4–8.2)
RBC # BLD AUTO: 4.53 M/UL (ref 4.2–5.9)
SODIUM SERPL-SCNC: 139 MMOL/L (ref 136–145)
WBC # BLD AUTO: 5.3 K/UL (ref 4–11)

## 2024-07-08 PROCEDURE — 99284 EMERGENCY DEPT VISIT MOD MDM: CPT

## 2024-07-08 PROCEDURE — 83605 ASSAY OF LACTIC ACID: CPT

## 2024-07-08 PROCEDURE — 80053 COMPREHEN METABOLIC PANEL: CPT

## 2024-07-08 PROCEDURE — 73630 X-RAY EXAM OF FOOT: CPT

## 2024-07-08 PROCEDURE — 85025 COMPLETE CBC W/AUTO DIFF WBC: CPT

## 2024-07-08 RX ORDER — SULFAMETHOXAZOLE AND TRIMETHOPRIM 800; 160 MG/1; MG/1
1 TABLET ORAL 2 TIMES DAILY
Qty: 14 TABLET | Refills: 0 | Status: SHIPPED | OUTPATIENT
Start: 2024-07-08 | End: 2024-07-15

## 2024-07-08 RX ORDER — CEPHALEXIN 500 MG/1
500 CAPSULE ORAL 2 TIMES DAILY
Qty: 14 CAPSULE | Refills: 0 | Status: SHIPPED | OUTPATIENT
Start: 2024-07-08 | End: 2024-07-15

## 2024-07-08 ASSESSMENT — LIFESTYLE VARIABLES
HOW OFTEN DO YOU HAVE A DRINK CONTAINING ALCOHOL: MONTHLY OR LESS
HOW MANY STANDARD DRINKS CONTAINING ALCOHOL DO YOU HAVE ON A TYPICAL DAY: 1 OR 2

## 2024-07-08 ASSESSMENT — PAIN SCALES - GENERAL: PAINLEVEL_OUTOF10: 6

## 2024-07-08 ASSESSMENT — PAIN DESCRIPTION - ORIENTATION: ORIENTATION: LEFT

## 2024-07-08 ASSESSMENT — PAIN - FUNCTIONAL ASSESSMENT: PAIN_FUNCTIONAL_ASSESSMENT: 0-10

## 2024-07-08 ASSESSMENT — PAIN DESCRIPTION - DESCRIPTORS: DESCRIPTORS: ACHING

## 2024-07-08 ASSESSMENT — PAIN DESCRIPTION - LOCATION: LOCATION: FOOT

## 2024-07-08 NOTE — DISCHARGE INSTRUCTIONS
Keflex and Bactrim were prescribed.  Please take these antibiotics.  Referral for a PCP was provided, please call establish care.  Return if you experience new or worsening symptoms.

## 2024-07-08 NOTE — ED PROVIDER NOTES
[facility] EMERGENCY DEPARTMENT  EMERGENCY DEPARTMENT ENCOUNTER        Pt Name: Moises Ponce  MRN: [id number]  Birthdate 1995  Date of evaluation: 7/8/2024  Provider: Carmen Carlson PA-C  PCP: No primary care provider on file.  Note Started: 11:48 AM EDT 7/8/24      KANU. I have evaluated this patient.        CHIEF COMPLAINT       Chief Complaint   Patient presents with    Wound Check     Pt to ED c/o left foot wound on top of foot. Pt states \"I stopped injecting meth a couple months ago and now I am having foot pain and it wont heal\" pt rates pain 6/10       HISTORY OF PRESENT ILLNESS: 1 or more Elements     History From: Patient            Chief Complaint:Wound check    Moises Ponce is a 29 y.o. male who presents to the ED with a concern of a wound on left foot.  Patient said last time he used meth was 2 months ago, no recent drug use. Endorses chills.  Denies nausea, vomiting, fever, numbness, tingling in lower extremity, no chest pain, shortness of breath, headaches, dizziness or other symptoms.  Patient able to ambulate and bear weight on extremity, denies purulent discharge from wound  Denies other wounds.    Nursing Notes were all reviewed and agreed with or any disagreements were addressed in the HPI.    REVIEW OF SYSTEMS :      Review of Systems    Positives and Pertinent negatives as per HPI.     SURGICAL HISTORY     Past Surgical History:   Procedure Laterality Date    CHEST TUBE INSERTION Right     pneumo in November 2022       CURRENTMEDICATIONS       Discharge Medication List as of 7/8/2024 11:57 AM        CONTINUE these medications which have NOT CHANGED    Details   naloxone 4 MG/0.1ML LIQD nasal spray 1 spray by Nasal route as needed (For accidental Heroin / Opioid overdose), Disp-1 each, R-0Normal      levETIRAcetam (KEPPRA) 500 MG tablet Take 1 tablet by mouth in the morning and 1 tablet before bedtime., Disp-60 tablet, R-3Normal             ALLERGIES

## 2024-08-14 ENCOUNTER — OFFICE VISIT (OUTPATIENT)
Dept: PRIMARY CARE CLINIC | Age: 29
End: 2024-08-14

## 2024-08-14 VITALS
BODY MASS INDEX: 32.91 KG/M2 | WEIGHT: 243 LBS | HEIGHT: 72 IN | DIASTOLIC BLOOD PRESSURE: 84 MMHG | SYSTOLIC BLOOD PRESSURE: 132 MMHG

## 2024-08-14 DIAGNOSIS — S91.309A WOUND OF FOOT: Primary | ICD-10-CM

## 2024-08-14 DIAGNOSIS — F25.0 SCHIZOAFFECTIVE DISORDER, BIPOLAR TYPE (HCC): ICD-10-CM

## 2024-08-14 DIAGNOSIS — M54.42 CHRONIC MIDLINE LOW BACK PAIN WITH BILATERAL SCIATICA: ICD-10-CM

## 2024-08-14 DIAGNOSIS — I10 PRIMARY HYPERTENSION: ICD-10-CM

## 2024-08-14 DIAGNOSIS — F43.10 PTSD (POST-TRAUMATIC STRESS DISORDER): ICD-10-CM

## 2024-08-14 DIAGNOSIS — Z00.00 ENCOUNTER FOR MEDICAL EXAMINATION TO ESTABLISH CARE: ICD-10-CM

## 2024-08-14 DIAGNOSIS — Z87.898 HISTORY OF INTRAVENOUS DRUG USE IN REMISSION: ICD-10-CM

## 2024-08-14 DIAGNOSIS — E66.9 OBESITY (BMI 30-39.9): ICD-10-CM

## 2024-08-14 DIAGNOSIS — Z72.0 VAPES NICOTINE CONTAINING SUBSTANCE: ICD-10-CM

## 2024-08-14 DIAGNOSIS — Z13.220 SCREENING FOR LIPID DISORDERS: ICD-10-CM

## 2024-08-14 DIAGNOSIS — Z13.0 SCREENING FOR DEFICIENCY ANEMIA: ICD-10-CM

## 2024-08-14 DIAGNOSIS — Z13.21 ENCOUNTER FOR VITAMIN DEFICIENCY SCREENING: ICD-10-CM

## 2024-08-14 DIAGNOSIS — Z13.228 SCREENING FOR METABOLIC DISORDER: ICD-10-CM

## 2024-08-14 DIAGNOSIS — Z13.1 SCREENING FOR DIABETES MELLITUS: ICD-10-CM

## 2024-08-14 DIAGNOSIS — M54.41 CHRONIC MIDLINE LOW BACK PAIN WITH BILATERAL SCIATICA: ICD-10-CM

## 2024-08-14 DIAGNOSIS — G89.29 CHRONIC MIDLINE LOW BACK PAIN WITH BILATERAL SCIATICA: ICD-10-CM

## 2024-08-14 DIAGNOSIS — Z13.29 SCREENING FOR THYROID DISORDER: ICD-10-CM

## 2024-08-14 LAB
CREAT UR-MCNC: 411 MG/DL (ref 39–259)
MICROALBUMIN UR DL<=1MG/L-MCNC: 1.53 MG/DL
MICROALBUMIN/CREAT UR: 3.7 MG/G (ref 0–30)

## 2024-08-14 RX ORDER — DIVALPROEX SODIUM 250 MG/1
250 TABLET, DELAYED RELEASE ORAL
COMMUNITY
Start: 2024-07-21

## 2024-08-14 RX ORDER — OLANZAPINE 10 MG/1
10 TABLET ORAL DAILY
COMMUNITY

## 2024-08-14 RX ORDER — OLANZAPINE 15 MG/1
30 TABLET ORAL NIGHTLY
COMMUNITY
Start: 2024-06-21

## 2024-08-14 RX ORDER — HYDROCHLOROTHIAZIDE 12.5 MG/1
12.5 CAPSULE, GELATIN COATED ORAL EVERY MORNING
Qty: 90 CAPSULE | Refills: 0 | Status: SHIPPED | OUTPATIENT
Start: 2024-08-14

## 2024-08-14 RX ORDER — HYDROXYZINE PAMOATE 50 MG/1
100 CAPSULE ORAL 2 TIMES DAILY
COMMUNITY
Start: 2024-07-21

## 2024-08-14 RX ORDER — LIDOCAINE 50 MG/G
1 PATCH TOPICAL DAILY
Qty: 10 PATCH | Refills: 0 | Status: SHIPPED | OUTPATIENT
Start: 2024-08-14 | End: 2024-08-24

## 2024-08-14 ASSESSMENT — PATIENT HEALTH QUESTIONNAIRE - PHQ9
SUM OF ALL RESPONSES TO PHQ9 QUESTIONS 1 & 2: 0
SUM OF ALL RESPONSES TO PHQ QUESTIONS 1-9: 7
SUM OF ALL RESPONSES TO PHQ QUESTIONS 1-9: 7
7. TROUBLE CONCENTRATING ON THINGS, SUCH AS READING THE NEWSPAPER OR WATCHING TELEVISION: SEVERAL DAYS
2. FEELING DOWN, DEPRESSED OR HOPELESS: NOT AT ALL
9. THOUGHTS THAT YOU WOULD BE BETTER OFF DEAD, OR OF HURTING YOURSELF: NOT AT ALL
1. LITTLE INTEREST OR PLEASURE IN DOING THINGS: NOT AT ALL
10. IF YOU CHECKED OFF ANY PROBLEMS, HOW DIFFICULT HAVE THESE PROBLEMS MADE IT FOR YOU TO DO YOUR WORK, TAKE CARE OF THINGS AT HOME, OR GET ALONG WITH OTHER PEOPLE: SOMEWHAT DIFFICULT
5. POOR APPETITE OR OVEREATING: SEVERAL DAYS
3. TROUBLE FALLING OR STAYING ASLEEP: MORE THAN HALF THE DAYS
SUM OF ALL RESPONSES TO PHQ QUESTIONS 1-9: 7
SUM OF ALL RESPONSES TO PHQ QUESTIONS 1-9: 7
6. FEELING BAD ABOUT YOURSELF - OR THAT YOU ARE A FAILURE OR HAVE LET YOURSELF OR YOUR FAMILY DOWN: SEVERAL DAYS
4. FEELING TIRED OR HAVING LITTLE ENERGY: SEVERAL DAYS
8. MOVING OR SPEAKING SO SLOWLY THAT OTHER PEOPLE COULD HAVE NOTICED. OR THE OPPOSITE, BEING SO FIGETY OR RESTLESS THAT YOU HAVE BEEN MOVING AROUND A LOT MORE THAN USUAL: SEVERAL DAYS

## 2024-08-14 ASSESSMENT — ENCOUNTER SYMPTOMS
CHEST TIGHTNESS: 0
WHEEZING: 0
BACK PAIN: 1
SHORTNESS OF BREATH: 0

## 2024-08-14 NOTE — ASSESSMENT & PLAN NOTE
Borderline controlled, continue current medications and medication adherence emphasized  Followed by [facility]

## 2024-08-14 NOTE — PROGRESS NOTES
Moises Ponce (:  1995) is a 29 y.o. male,New patient, here for evaluation of the following chief complaint(s):  Establish Care (Has not had PCP )         Assessment & Plan  Primary hypertension       Orders:    hydroCHLOROthiazide 12.5 MG capsule; Take 1 capsule by mouth every morning    Microalbumin / Creatinine Urine Ratio    Wound of foot   Uncontrolled, referral for eval to wound care    Orders:    [facility] [place]    Schizoaffective disorder, bipolar type (HCC)   Borderline controlled, continue current medications and medication adherence emphasized  Followed by [facility]       Chronic midline low back pain with bilateral sciatica   Uncontrolled, continue current treatment plan and lifestyle modifications recommended    Orders:    lidocaine (LIDODERM) 5 %; Place 1 patch onto the skin daily for 10 days 12 hours on, 12 hours off.    PTSD (post-traumatic stress disorder)     Followed by [facility]       Vapes nicotine containing substance   Uncontrolled, lifestyle modifications recommended         History of intravenous drug use in remission   At goal, continue current treatment plan    Orders:    Hepatitis C Antibody; Future    HIV Screen; Future    Obesity (BMI 30-39.9)   Uncontrolled, lifestyle modifications recommended         Encounter for medical examination to establish care            Screening for deficiency anemia       Orders:    CBC with Auto Differential; Future    Encounter for vitamin deficiency screening       Orders:    Vitamin D 25 Hydroxy; Future    Screening for diabetes mellitus       Orders:    Hemoglobin A1C; Future    Screening for lipid disorders       Orders:    Lipid Panel; Future    Screening for thyroid disorder       Orders:    TSH with Reflex; Future    Screening for metabolic disorder       Orders:    Comprehensive Metabolic Panel; Future      Return in about 4 weeks (around 2024) for Blood pressure check, Medication Check.

## 2024-08-14 NOTE — ASSESSMENT & PLAN NOTE
Orders:    hydroCHLOROthiazide 12.5 MG capsule; Take 1 capsule by mouth every morning    Microalbumin / Creatinine Urine Ratio

## 2024-08-14 NOTE — ASSESSMENT & PLAN NOTE
Uncontrolled, continue current treatment plan and lifestyle modifications recommended    Orders:    lidocaine (LIDODERM) 5 %; Place 1 patch onto the skin daily for 10 days 12 hours on, 12 hours off.

## 2024-08-14 NOTE — ASSESSMENT & PLAN NOTE
At goal, continue current treatment plan    Orders:    Hepatitis C Antibody; Future    HIV Screen; Future

## 2024-10-25 DIAGNOSIS — M54.41 CHRONIC MIDLINE LOW BACK PAIN WITH BILATERAL SCIATICA: ICD-10-CM

## 2024-10-25 DIAGNOSIS — M54.42 CHRONIC MIDLINE LOW BACK PAIN WITH BILATERAL SCIATICA: ICD-10-CM

## 2024-10-25 DIAGNOSIS — G89.29 CHRONIC MIDLINE LOW BACK PAIN WITH BILATERAL SCIATICA: ICD-10-CM

## 2024-10-25 DIAGNOSIS — I10 PRIMARY HYPERTENSION: ICD-10-CM

## 2024-10-25 RX ORDER — HYDROCHLOROTHIAZIDE 12.5 MG/1
12.5 CAPSULE ORAL EVERY MORNING
Qty: 90 CAPSULE | Refills: 0 | Status: SHIPPED | OUTPATIENT
Start: 2024-10-25

## 2024-10-25 RX ORDER — LIDOCAINE 50 MG/G
PATCH TOPICAL
Qty: 10 PATCH | Refills: 0 | Status: SHIPPED | OUTPATIENT
Start: 2024-10-25

## 2024-11-25 DIAGNOSIS — M54.41 CHRONIC MIDLINE LOW BACK PAIN WITH BILATERAL SCIATICA: ICD-10-CM

## 2024-11-25 DIAGNOSIS — M54.42 CHRONIC MIDLINE LOW BACK PAIN WITH BILATERAL SCIATICA: ICD-10-CM

## 2024-11-25 DIAGNOSIS — G89.29 CHRONIC MIDLINE LOW BACK PAIN WITH BILATERAL SCIATICA: ICD-10-CM

## 2024-11-26 RX ORDER — LIDOCAINE 50 MG/G
PATCH TOPICAL
Qty: 10 PATCH | Refills: 0 | Status: SHIPPED | OUTPATIENT
Start: 2024-11-26

## 2024-11-26 NOTE — TELEPHONE ENCOUNTER
Last office visit 8/14/2024     Last written 10/25/24     Next office visit scheduled Visit date not found    Requested Prescriptions     Pending Prescriptions Disp Refills    lidocaine (LIDODERM) 5 % [Pharmacy Med Name: LIDOCAINE 5% PATCH] 10 patch 0     Sig: APPLY 1 PATCH TO AFFECTED AREA FOR 12 HOURS IN A 24 HOUR PERIOD

## 2024-12-04 ENCOUNTER — OFFICE VISIT (OUTPATIENT)
Dept: PRIMARY CARE CLINIC | Age: 29
End: 2024-12-04
Payer: COMMERCIAL

## 2024-12-04 VITALS
HEIGHT: 72 IN | DIASTOLIC BLOOD PRESSURE: 88 MMHG | SYSTOLIC BLOOD PRESSURE: 138 MMHG | BODY MASS INDEX: 36.52 KG/M2 | WEIGHT: 269.6 LBS

## 2024-12-04 DIAGNOSIS — G89.29 CHRONIC MIDLINE LOW BACK PAIN WITH BILATERAL SCIATICA: Primary | ICD-10-CM

## 2024-12-04 DIAGNOSIS — M54.41 CHRONIC MIDLINE LOW BACK PAIN WITH BILATERAL SCIATICA: Primary | ICD-10-CM

## 2024-12-04 DIAGNOSIS — M54.2 CHRONIC NECK AND BACK PAIN: ICD-10-CM

## 2024-12-04 DIAGNOSIS — M54.9 CHRONIC NECK AND BACK PAIN: ICD-10-CM

## 2024-12-04 DIAGNOSIS — M54.42 CHRONIC MIDLINE LOW BACK PAIN WITH BILATERAL SCIATICA: Primary | ICD-10-CM

## 2024-12-04 DIAGNOSIS — G89.29 CHRONIC NECK AND BACK PAIN: ICD-10-CM

## 2024-12-04 PROCEDURE — 99213 OFFICE O/P EST LOW 20 MIN: CPT

## 2024-12-04 PROCEDURE — 3075F SYST BP GE 130 - 139MM HG: CPT

## 2024-12-04 PROCEDURE — G8417 CALC BMI ABV UP PARAM F/U: HCPCS

## 2024-12-04 PROCEDURE — 3079F DIAST BP 80-89 MM HG: CPT

## 2024-12-04 PROCEDURE — G8427 DOCREV CUR MEDS BY ELIG CLIN: HCPCS

## 2024-12-04 PROCEDURE — 4004F PT TOBACCO SCREEN RCVD TLK: CPT

## 2024-12-04 PROCEDURE — G8484 FLU IMMUNIZE NO ADMIN: HCPCS

## 2024-12-04 RX ORDER — MIRTAZAPINE 15 MG/1
15 TABLET, FILM COATED ORAL NIGHTLY
COMMUNITY
Start: 2024-11-25

## 2024-12-04 RX ORDER — OXCARBAZEPINE 300 MG/1
300 TABLET, FILM COATED ORAL 2 TIMES DAILY
COMMUNITY
Start: 2024-11-25

## 2024-12-04 RX ORDER — PREDNISONE 20 MG/1
20 TABLET ORAL 2 TIMES DAILY
Qty: 10 TABLET | Refills: 0 | Status: SHIPPED | OUTPATIENT
Start: 2024-12-04 | End: 2024-12-09

## 2024-12-04 RX ORDER — CARIPRAZINE 3 MG/1
3 CAPSULE, GELATIN COATED ORAL DAILY
COMMUNITY

## 2024-12-04 ASSESSMENT — ENCOUNTER SYMPTOMS
SHORTNESS OF BREATH: 0
BACK PAIN: 1
CHEST TIGHTNESS: 0

## 2024-12-04 NOTE — PATIENT INSTRUCTIONS
The medication list included in this document is our record of what you are currently taking, including any changes that were made at today's visit.  If you find any differences when compared to your medications at home, or have any questions that were not answered at your visit, please contact the office.              [facility] 211   Speak to a trained professional 24/7 who can connect you to essential community services including food, clothing, transportation, housing, utilities, employment services, childcare, and baby supplies. 211 serves nationwide.  [address] [place] [address].[employer] for resources in [place] [facility], [place] and [facility] in [place]; [place], [place], [place], and [facility] in [place].   [facility].[employer]/resources for resources in [facility], [facility], [place], [place], [place], [place], [facility], [place], [place], and [facility] in [place].    [employer]   What they offer: [employer] is a nationwide network of food [place], food pantries, and meal programs.  Website: https://www.feedingNormOxys.org/find-your-local-foodDignity Health St. [facility] and [facility]      National Hunger Hotline  What they offer: The hotline is a resource for individuals and families seeking information on how and where to obtain food.   Website:  https://www.hungerfreeamerica.org/en-us/[facility] [www.[facility].com]-national-hunger-hotline    Hunger Hotline Phone Number: 6-867-6-HUNGRY (6-434-302-1917)  Hours of Operation: Monday - Friday 7am to 10pm   The Hunger Hotline also operates a texting service. Our number is 225-305-1661. Please note that these are automated texts and we do not reply or monitor texts.   [facility]  What they offer: $1 for $1 matching for families receiving SNAP/food stamps when spent on “healthy” food.  The match money can be used to purchase fruits and vegetables so adds more healthy food choices for SNAP beneficiaries.   Contact: https://[employer].TRIBAX/locations/     SNAP (formerly Food [place])   What they offer: SNAP is used

## 2024-12-04 NOTE — ASSESSMENT & PLAN NOTE
Chronic, worsening (exacerbation), continue current plan pending work up below  Will refer to jamal or [facility] for further follow up, if no improvement in symptoms with PT.   Orders:    predniSONE (DELTASONE) 20 MG tablet; Take 1 tablet by mouth 2 times daily for 5 days    [facility] - [facility]

## 2024-12-09 ENCOUNTER — HOSPITAL ENCOUNTER (OUTPATIENT)
Dept: PHYSICAL THERAPY | Age: 29
Setting detail: THERAPIES SERIES
Discharge: HOME | End: 2024-12-09
Payer: COMMERCIAL

## 2024-12-09 DIAGNOSIS — Z74.09 DECREASED FUNCTIONAL MOBILITY AND ENDURANCE: ICD-10-CM

## 2024-12-09 DIAGNOSIS — M25.60 STIFFNESS IN JOINT: ICD-10-CM

## 2024-12-09 DIAGNOSIS — R52 PAIN: ICD-10-CM

## 2024-12-09 DIAGNOSIS — R53.1 WEAKNESS: Primary | ICD-10-CM

## 2024-12-09 PROCEDURE — 97530 THERAPEUTIC ACTIVITIES: CPT

## 2024-12-09 PROCEDURE — 97110 THERAPEUTIC EXERCISES: CPT

## 2024-12-09 PROCEDURE — 97161 PT EVAL LOW COMPLEX 20 MIN: CPT

## 2024-12-09 NOTE — FLOWSHEET NOTE
to strengthening, flexibility, endurance, ROM performed to prevent loss of range of motion, maintain or improve muscular strength or increase flexibility, following either an injury or surgery    GOALS     Patient stated goal: \"not in so much pain\"  [] Progressing: [] Met: [] Not Met: [] Adjusted    Therapist goals for Patient:   Short Term Goals: To be achieved in: 2 -4 weeks  1. Independent in HEP and progression per patient tolerance, in order to prevent re-injury.   [] Progressing: [] Met: [] Not Met: [] Adjusted  2. Patient will have a decrease in pain by 20-30% to facilitate improvement in movement, function, and ADLs as indicated by Functional Deficits.  [] Progressing: [] Met: [] Not Met: [] Adjusted    Long Term Goals: To be achieved in: at DC  1. Disability index score of 15 or less for the Modified Oswestry to assist with reaching prior level of function with activities such as sleeping and self care.  [] Progressing: [] Met: [] Not Met: [] Adjusted  2. Patient will demonstrate increased AROM of neck/shoulders and low back/hips to WNL without pain to allow for proper joint functioning to enable patient to drive and transfer with less pain.   [] Progressing: [] Met: [] Not Met: [] Adjusted  3. Patient will demonstrate increased Strength of B LE and UE to at least 5/5 throughout without pain to allow for proper functional mobility to enable patient to return to lifting and light housework.   [] Progressing: [] Met: [] Not Met: [] Adjusted  4. Patient will return to walking and standing to tolerate community distances such as shopping without increased symptoms or restriction.   [] Progressing: [] Met: [] Not Met: [] Adjusted  5. \"Feel well enough to be able to work again\"(patient specific functional goal)    [] Progressing: [] Met: [] Not Met: [] Adjusted          Overall Progression Towards Functional goals/ Treatment Progress Update:  [] Patient is progressing as expected towards functional goals listed.
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
## 2024-12-10 ENCOUNTER — HOSPITAL ENCOUNTER (OUTPATIENT)
Dept: PHYSICAL THERAPY | Age: 29
Setting detail: THERAPIES SERIES
Discharge: HOME | End: 2024-12-10
Payer: COMMERCIAL

## 2024-12-10 PROCEDURE — 97113 AQUATIC THERAPY/EXERCISES: CPT

## 2024-12-11 DIAGNOSIS — G89.29 CHRONIC NECK AND BACK PAIN: ICD-10-CM

## 2024-12-11 DIAGNOSIS — M54.41 CHRONIC MIDLINE LOW BACK PAIN WITH BILATERAL SCIATICA: ICD-10-CM

## 2024-12-11 DIAGNOSIS — M54.42 CHRONIC MIDLINE LOW BACK PAIN WITH BILATERAL SCIATICA: ICD-10-CM

## 2024-12-11 DIAGNOSIS — M54.2 CHRONIC NECK AND BACK PAIN: ICD-10-CM

## 2024-12-11 DIAGNOSIS — G89.29 CHRONIC MIDLINE LOW BACK PAIN WITH BILATERAL SCIATICA: ICD-10-CM

## 2024-12-11 DIAGNOSIS — M54.9 CHRONIC NECK AND BACK PAIN: ICD-10-CM

## 2024-12-11 RX ORDER — LIDOCAINE 50 MG/G
PATCH TOPICAL
Qty: 10 PATCH | Refills: 0 | Status: SHIPPED | OUTPATIENT
Start: 2024-12-11

## 2024-12-11 RX ORDER — PREDNISONE 20 MG/1
TABLET ORAL
Qty: 10 TABLET | Refills: 0 | OUTPATIENT
Start: 2024-12-11

## 2024-12-12 ENCOUNTER — HOSPITAL ENCOUNTER (OUTPATIENT)
Dept: PHYSICAL THERAPY | Age: 29
Setting detail: THERAPIES SERIES
Discharge: HOME | End: 2024-12-12
Payer: COMMERCIAL

## 2024-12-12 PROCEDURE — 97113 AQUATIC THERAPY/EXERCISES: CPT

## 2024-12-12 PROCEDURE — 97150 GROUP THERAPEUTIC PROCEDURES: CPT

## 2024-12-12 NOTE — FLOWSHEET NOTE
[facility] [address] [place] [address] office: [phone] fax: [phone]      Physical Therapy: TREATMENT/PROGRESS NOTE   Patient: Moises Ponce (29 y.o. male)  \"Doni\" Examination Date: 2024   :  1995 MRN: [id number]   Visit #: 3 /8  Insurance Allowable Auth Needed   30 vpcy []Yes    [x]No    Insurance: [facility] [facility] [place] Plan: [employer] [place] MEDICA[place] / Product Type: *No Product type* /   Insurance ID: [id number] - (Medicaid Managed)  Secondary Insurance (if applicable):    Treatment Diagnosis:     ICD-10-CM    1. Weakness  R53.1       2. Stiffness in joint  M25.60       3. Decreased functional mobility and endurance  Z74.09       4. Pain  R52          Medical Diagnosis:  Chronic midline low back pain with bilateral sciatica [M54.41, M54.42, G89.29]  Chronic neck and back pain [M54.2, M54.9, G89.29]   Referring Physician: Tej Pike APRN*  PCP: Tej Pike APRN - CNP     Plan of care signed (Y/N): faxed at eval    Date of Patient follow up with Physician: PCP in January      Plan of Care Report: NO  POC update due: (10 visits /OR AUTH LIMITS, whichever is less)  2025                                             Medical History:  Comorbidities:  Other Psychological Conditions: Bipolar  Other Neurological Conditions: seizures - none for 2 years   Relevant Medical History:                                          Precautions/ Contra-indications:           Latex allergy:  NO  Pacemaker:    NO  Contraindications for Manipulation: NA  Date of Surgery: NA  Other:    Red Flags:  Fevers, chills, night sweats  Fatigue/Malaise    Suicide Screening:   The patient did not verbalize a primary behavioral concern, suicidal ideation, suicidal intent, or demonstrate suicidal behaviors.    Preferred Language for Healthcare:   [x] English       [] other:    SUBJECTIVE EXAMINATION     Patient stated complaint: Pt involved in

## 2024-12-17 ENCOUNTER — HOSPITAL ENCOUNTER (OUTPATIENT)
Dept: PHYSICAL THERAPY | Age: 29
Setting detail: THERAPIES SERIES
Discharge: HOME | End: 2024-12-17
Payer: COMMERCIAL

## 2024-12-17 PROCEDURE — 97150 GROUP THERAPEUTIC PROCEDURES: CPT

## 2024-12-17 PROCEDURE — 97113 AQUATIC THERAPY/EXERCISES: CPT

## 2024-12-17 NOTE — FLOWSHEET NOTE
R low back, neck, and numb ant shins  Patient describes pain to be Sharp, tingling, and stiffness  Pain decreases with: Sitting, Heat, and fetal position to sleep; manual manipulation of neck   Pain increases with:  standing and end of day, prone lying       Living status:     Occupation/School:  Work/School Status: NA  Job Duties/Demands: NA        OBJECTIVE EXAMINATION     12/9/24  ROM/Strength: (Blank cells denote NT)      Mvmt (norm) ROM L ROM R Notes MMT L MMT R Notes     CERVICAL Flex (60) 42       Ext (70) 47 inc pain        SB(45) 45 42 pain        Rotation (80) Min dec  Min dec            SHOULDER Flexion (180) full full  4+ 4+     Abduction (180)    5- 5-     ER -0          ER -90 (90) full Full  4+ 4+     IR -0          IR -90 (70) To waist To waist  4+ 4+      ELBOW Flex/biceps (140)          Ext/triceps (0)          Pronation (80)          Supination (80)             WRIST Flexion (60)          Extension (60)          RD (20)          UD (20)           N/a N/a          Mvmt (norm) ROM L ROM R Notes MMT L MMT R Notes     LUMBAR   Flex (90) Min dec    Sore in general with all ROM    Ext (25) WFL       SB (25) WFL WFL        Lumbar Rot             HIP Flex (120)    4+ 4+     Abd (45)    4+ 5-     ER (50)          IR (45)          Ext (20)    SKC  Pirif str  HSS       Min tight all     Min tight all     Able to bridge    KNEE   Flex (140)    5 5     Ext (0)    5 5        ANKLE DF (20)    5 5     PF (50)          Inversion (30)          Eversion (20)           Repeated Movements: [] Normal  [] Abnormal [] N/A  RFIS x 10 - didn't change pain, no inc in LE numbness  SHAYAN x 10  - inc LBP, no LE numbness   DKC x 10 - \"relieved pressure on back\"    Palpation:   Patient reported tenderness with palpation  Location:B LPVM, central lumbar spine, R gluts; TTP B UT and scapular mm, B CPVM    Posture:   WNL    Bandages/Dressings/Incisions:  Not Applicable    Dermatomes: Abnormal findings listed below  C/o numbness

## 2024-12-19 ENCOUNTER — APPOINTMENT (OUTPATIENT)
Dept: PHYSICAL THERAPY | Age: 29
End: 2024-12-19
Payer: COMMERCIAL
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
## 2024-12-24 ENCOUNTER — HOSPITAL ENCOUNTER (OUTPATIENT)
Dept: PHYSICAL THERAPY | Age: 29
Setting detail: THERAPIES SERIES
Discharge: HOME | End: 2024-12-24
Payer: COMMERCIAL

## 2024-12-31 ENCOUNTER — HOSPITAL ENCOUNTER (OUTPATIENT)
Dept: PHYSICAL THERAPY | Age: 29
Setting detail: THERAPIES SERIES
Discharge: HOME | End: 2024-12-31
Payer: COMMERCIAL

## 2024-12-31 PROCEDURE — 97150 GROUP THERAPEUTIC PROCEDURES: CPT

## 2024-12-31 PROCEDURE — 97113 AQUATIC THERAPY/EXERCISES: CPT

## 2024-12-31 NOTE — FLOWSHEET NOTE
training.   Manual Therapy (54856) as indicated to include: Passive Range of Motion, Gr I-IV mobilizations, and Soft Tissue Mobilization  Modalities as needed that may include: Cryotherapy, Electrical Stimulation, Thermal Agents, Traction, and Ultrasound  Patient education on joint protection, postural re-education, activity modification, and progression of HEP  Aquatic Therapy (35236)    Plan: Cont POC- Continue emphasis/focus on exercise progression, improving proper muscle recruitment and activation/motor control patterns, and modulating pain. Next visit plan to progress reps, add new exercises, and progress per flowsheet - add tband exercises.         Electronically Signed by Indu Banerjee, PT 1402   Date: 12/31/2024     Note: Portions of this note have been templated and/or copied from initial evaluation, reassessments and prior notes for documentation efficiency.    Note: If patient does not return for scheduled/recommended follow up visits, this note will serve as a discharge from care along with the most recent update on progress.    Ortho Evaluation

## 2025-01-02 ENCOUNTER — HOSPITAL ENCOUNTER (OUTPATIENT)
Dept: PHYSICAL THERAPY | Age: 30
Setting detail: THERAPIES SERIES
End: 2025-01-02
Payer: COMMERCIAL

## 2025-01-07 ENCOUNTER — HOSPITAL ENCOUNTER (OUTPATIENT)
Dept: PHYSICAL THERAPY | Age: 30
Setting detail: THERAPIES SERIES
End: 2025-01-07
Payer: COMMERCIAL

## 2025-01-08 DIAGNOSIS — M54.2 CHRONIC NECK AND BACK PAIN: ICD-10-CM

## 2025-01-08 DIAGNOSIS — M54.41 CHRONIC MIDLINE LOW BACK PAIN WITH BILATERAL SCIATICA: ICD-10-CM

## 2025-01-08 DIAGNOSIS — G89.29 CHRONIC MIDLINE LOW BACK PAIN WITH BILATERAL SCIATICA: ICD-10-CM

## 2025-01-08 DIAGNOSIS — M54.9 CHRONIC NECK AND BACK PAIN: ICD-10-CM

## 2025-01-08 DIAGNOSIS — M54.42 CHRONIC MIDLINE LOW BACK PAIN WITH BILATERAL SCIATICA: ICD-10-CM

## 2025-01-08 DIAGNOSIS — G89.29 CHRONIC NECK AND BACK PAIN: ICD-10-CM

## 2025-01-09 ENCOUNTER — HOSPITAL ENCOUNTER (OUTPATIENT)
Dept: PHYSICAL THERAPY | Age: 30
Setting detail: THERAPIES SERIES
End: 2025-01-09
Payer: COMMERCIAL

## 2025-01-09 RX ORDER — PREDNISONE 20 MG/1
TABLET ORAL
Qty: 10 TABLET | Refills: 0 | OUTPATIENT
Start: 2025-01-09

## 2025-01-09 RX ORDER — LIDOCAINE 50 MG/G
PATCH TOPICAL
Qty: 10 PATCH | Refills: 0 | Status: SHIPPED | OUTPATIENT
Start: 2025-01-09

## 2025-01-09 NOTE — TELEPHONE ENCOUNTER
Last office visit 12/4/2024     Last written 12/11/2024     Next office visit scheduled 1/15/2025    Requested Prescriptions     Pending Prescriptions Disp Refills    predniSONE (DELTASONE) 20 MG tablet [Pharmacy Med Name: predniSONE 20 MG TABLET] 10 tablet 0     Sig: TAKE 1 TABLET BY MOUTH TWICE A DAY FOR 5 DAYS    lidocaine (LIDODERM) 5 % [Pharmacy Med Name: LIDOCAINE 5% PATCH] 10 patch 0     Sig: APPLY 1 PATCH TO AFFECTED AREA FOR 12 HOURS IN A 24 HOUR PERIOD

## 2025-01-14 ENCOUNTER — HOSPITAL ENCOUNTER (OUTPATIENT)
Dept: PHYSICAL THERAPY | Age: 30
Setting detail: THERAPIES SERIES
End: 2025-01-14
Payer: COMMERCIAL

## 2025-01-16 ENCOUNTER — HOSPITAL ENCOUNTER (OUTPATIENT)
Dept: PHYSICAL THERAPY | Age: 30
Setting detail: THERAPIES SERIES
Discharge: HOME | End: 2025-01-16
Payer: COMMERCIAL

## 2025-01-16 ENCOUNTER — HOSPITAL ENCOUNTER (OUTPATIENT)
Dept: PHYSICAL THERAPY | Age: 30
Setting detail: THERAPIES SERIES
End: 2025-01-16
Payer: COMMERCIAL

## 2025-01-16 PROCEDURE — 97530 THERAPEUTIC ACTIVITIES: CPT

## 2025-01-16 PROCEDURE — 97150 GROUP THERAPEUTIC PROCEDURES: CPT

## 2025-01-16 PROCEDURE — 97113 AQUATIC THERAPY/EXERCISES: CPT

## 2025-01-16 NOTE — PLAN OF CARE
[facility] [address] [place] [address] office: [phone] fax: [phone]   Physical Therapy Discharge Summary    Dear CLAUDIA Lolyd *   ,    We had the pleasure of treating the following patient for physical therapy services at [facility].  A summary of our findings can be found in the discharge summary below.  If you have any questions or concerns regarding these findings, please do not hesitate to contact me at the office phone number checked above.  Thank you for the referral.         Total Visits: 6     Recommendation:    [] Continue PT x / wk for  weeks.   [] Hold PT, pending MD visit   [x] Discharge to [facility]. Follow up with PT or MD PRN.     Reason for Discharge:  No Subjective or Objective improvements / Plateaued and no longer responding to skilled intervention. and Condition is worsening and patient would benefit from follow up with referring provider.            Physical Therapy: TREATMENT/PROGRESS NOTE   Patient: Moises Ponce (29 y.o. male)  \"Doni\" Examination Date: 2025   :  1995 MRN: [id number]   Visit #:   Insurance Allowable Auth Needed   30 vpcy []Yes    [x]No    Insurance: Payor: [facility] / Plan: [facility] MEDICAID / Product Type: *No Product type* /   Insurance ID: [id number] - (Medicaid Managed)  Secondary Insurance (if applicable):    Treatment Diagnosis:     ICD-10-CM    1. Weakness  R53.1       2. Stiffness in joint  M25.60       3. Decreased functional mobility and endurance  Z74.09       4. Pain  R52          Medical Diagnosis:  Chronic midline low back pain with bilateral sciatica [M54.41, M54.42, G89.29]  Chronic neck and back pain [M54.2, M54.9, G89.29]   Referring Physician: Tej Pike APRN*  PCP: Tej Pike APRN - CNP     Plan of care signed (Y/N): faxed at eval    Date of Patient follow up with Physician: PCP in January      Plan of

## 2025-01-22 ENCOUNTER — OFFICE VISIT (OUTPATIENT)
Dept: PRIMARY CARE CLINIC | Age: 30
End: 2025-01-22
Payer: COMMERCIAL

## 2025-01-22 VITALS — DIASTOLIC BLOOD PRESSURE: 86 MMHG | SYSTOLIC BLOOD PRESSURE: 132 MMHG | WEIGHT: 269.2 LBS | BODY MASS INDEX: 36.51 KG/M2

## 2025-01-22 DIAGNOSIS — G89.29 CHRONIC BILATERAL LOW BACK PAIN WITH BILATERAL SCIATICA: Primary | ICD-10-CM

## 2025-01-22 DIAGNOSIS — M54.41 CHRONIC BILATERAL LOW BACK PAIN WITH BILATERAL SCIATICA: Primary | ICD-10-CM

## 2025-01-22 DIAGNOSIS — F25.0 SCHIZOAFFECTIVE DISORDER, BIPOLAR TYPE (HCC): ICD-10-CM

## 2025-01-22 DIAGNOSIS — F43.10 PTSD (POST-TRAUMATIC STRESS DISORDER): ICD-10-CM

## 2025-01-22 DIAGNOSIS — M54.42 CHRONIC BILATERAL LOW BACK PAIN WITH BILATERAL SCIATICA: Primary | ICD-10-CM

## 2025-01-22 PROCEDURE — 3079F DIAST BP 80-89 MM HG: CPT

## 2025-01-22 PROCEDURE — 3075F SYST BP GE 130 - 139MM HG: CPT

## 2025-01-22 PROCEDURE — 99214 OFFICE O/P EST MOD 30 MIN: CPT

## 2025-01-22 PROCEDURE — 4004F PT TOBACCO SCREEN RCVD TLK: CPT

## 2025-01-22 PROCEDURE — G8427 DOCREV CUR MEDS BY ELIG CLIN: HCPCS

## 2025-01-22 PROCEDURE — G8417 CALC BMI ABV UP PARAM F/U: HCPCS

## 2025-01-22 RX ORDER — MIRTAZAPINE 30 MG/1
30 TABLET, FILM COATED ORAL NIGHTLY
COMMUNITY
Start: 2025-01-08

## 2025-01-22 RX ORDER — OXCARBAZEPINE 150 MG/1
150 TABLET, FILM COATED ORAL DAILY
COMMUNITY
Start: 2025-01-08

## 2025-01-22 RX ORDER — PROPRANOLOL HCL 20 MG
20 TABLET ORAL AS NEEDED
COMMUNITY
Start: 2025-01-08

## 2025-01-22 RX ORDER — CARIPRAZINE 4.5 MG/1
4.5 CAPSULE, GELATIN COATED ORAL DAILY
COMMUNITY
Start: 2025-01-09

## 2025-01-22 NOTE — ASSESSMENT & PLAN NOTE
Chronic, not at goal (unstable), Referral placed to [facility] for medication management.      Orders:    Non [facility] - External Referral to Psychiatry

## 2025-01-22 NOTE — PROGRESS NOTES
palpitations and leg swelling.   Gastrointestinal: Negative.    Neurological:  Positive for weakness.   Psychiatric/Behavioral:  Positive for dysphoric mood and sleep disturbance.           Objective   Physical Exam  Vitals reviewed.   Constitutional:       Appearance: He is obese.   Cardiovascular:      Rate and Rhythm: Normal rate and regular rhythm.      Pulses: Normal pulses.      Heart sounds: Normal heart sounds.   Pulmonary:      Effort: Pulmonary effort is normal.      Breath sounds: Normal breath sounds.   Musculoskeletal:      Lumbar back: Spasms and tenderness present. Decreased range of motion. Positive right straight leg raise test and positive left straight leg raise test.   Neurological:      Mental Status: He is alert and oriented to person, place, and time.      Cranial Nerves: Cranial nerves 2-12 are intact.      Motor: Motor function is intact.      Coordination: Coordination is intact.   Psychiatric:         Attention and Perception: Attention normal.         Mood and Affect: Mood normal.         Behavior: Behavior is cooperative.              An electronic signature was used to authenticate this note.    --CLAUDIA Lloyd - CNP

## 2025-01-22 NOTE — ASSESSMENT & PLAN NOTE
Chronic, not at goal (unstable), Referral placed to [facility] for medication management.      Orders:    Non [facility] - External Referral to Psychiatry

## 2025-01-24 ASSESSMENT — ENCOUNTER SYMPTOMS
CHEST TIGHTNESS: 0
GASTROINTESTINAL NEGATIVE: 1
SHORTNESS OF BREATH: 0

## 2025-02-13 ENCOUNTER — OFFICE VISIT (OUTPATIENT)
Dept: ORTHOPEDIC SURGERY | Age: 30
End: 2025-02-13
Payer: COMMERCIAL

## 2025-02-13 VITALS — WEIGHT: 269 LBS | RESPIRATION RATE: 16 BRPM | HEIGHT: 72 IN | BODY MASS INDEX: 36.44 KG/M2

## 2025-02-13 DIAGNOSIS — M54.2 NECK PAIN: Primary | ICD-10-CM

## 2025-02-13 DIAGNOSIS — S19.80XS CHRONIC PAIN AFTER WHIPLASH INJURY TO NECK: ICD-10-CM

## 2025-02-13 DIAGNOSIS — G89.21 CHRONIC PAIN AFTER WHIPLASH INJURY TO NECK: ICD-10-CM

## 2025-02-13 PROCEDURE — 99203 OFFICE O/P NEW LOW 30 MIN: CPT | Performed by: PHYSICIAN ASSISTANT

## 2025-02-13 PROCEDURE — G8427 DOCREV CUR MEDS BY ELIG CLIN: HCPCS | Performed by: PHYSICIAN ASSISTANT

## 2025-02-13 PROCEDURE — 4004F PT TOBACCO SCREEN RCVD TLK: CPT | Performed by: PHYSICIAN ASSISTANT

## 2025-02-13 PROCEDURE — G8417 CALC BMI ABV UP PARAM F/U: HCPCS | Performed by: PHYSICIAN ASSISTANT

## 2025-02-13 SDOH — HEALTH STABILITY: PHYSICAL HEALTH: ON AVERAGE, HOW MANY DAYS PER WEEK DO YOU ENGAGE IN MODERATE TO STRENUOUS EXERCISE (LIKE A BRISK WALK)?: 2 DAYS

## 2025-02-13 SDOH — HEALTH STABILITY: PHYSICAL HEALTH: ON AVERAGE, HOW MANY MINUTES DO YOU ENGAGE IN EXERCISE AT THIS LEVEL?: 10 MIN
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
## 2025-02-20 ENCOUNTER — TELEPHONE (OUTPATIENT)
Dept: ORTHOPEDIC SURGERY | Age: 30
End: 2025-02-20

## 2025-03-04 ENCOUNTER — HOSPITAL ENCOUNTER (OUTPATIENT)
Dept: MRI IMAGING | Age: 30
Discharge: HOME | End: 2025-03-04
Payer: COMMERCIAL

## 2025-03-04 DIAGNOSIS — G89.21 CHRONIC PAIN AFTER WHIPLASH INJURY TO NECK: ICD-10-CM

## 2025-03-04 DIAGNOSIS — S19.80XS CHRONIC PAIN AFTER WHIPLASH INJURY TO NECK: ICD-10-CM

## 2025-03-04 PROCEDURE — 72141 MRI NECK SPINE W/O DYE: CPT

## 2025-03-07 DIAGNOSIS — M54.42 CHRONIC MIDLINE LOW BACK PAIN WITH BILATERAL SCIATICA: ICD-10-CM

## 2025-03-07 DIAGNOSIS — M54.41 CHRONIC MIDLINE LOW BACK PAIN WITH BILATERAL SCIATICA: ICD-10-CM

## 2025-03-07 DIAGNOSIS — G89.29 CHRONIC MIDLINE LOW BACK PAIN WITH BILATERAL SCIATICA: ICD-10-CM

## 2025-03-10 RX ORDER — LIDOCAINE 50 MG/G
1 PATCH TOPICAL DAILY
Qty: 30 PATCH | Refills: 2 | Status: SHIPPED | OUTPATIENT
Start: 2025-03-10

## 2025-03-10 NOTE — TELEPHONE ENCOUNTER
Last office visit 2025     Last written 2025     Next office visit scheduled 2025    Requested Prescriptions     Pending Prescriptions Disp Refills    lidocaine (LIDODERM) 5 % 10 patch 0     Si hours on, 12 hours off.

## 2025-05-17 ENCOUNTER — RESULTS FOLLOW-UP (OUTPATIENT)
Dept: ORTHOPEDIC SURGERY | Age: 30
End: 2025-05-17

## 2025-05-19 ENCOUNTER — PATIENT MESSAGE (OUTPATIENT)
Dept: PRIMARY CARE CLINIC | Age: 30
End: 2025-05-19

## 2025-05-19 DIAGNOSIS — I10 PRIMARY HYPERTENSION: Primary | ICD-10-CM

## 2025-05-19 DIAGNOSIS — G89.29 CHRONIC MIDLINE LOW BACK PAIN WITH BILATERAL SCIATICA: ICD-10-CM

## 2025-05-19 DIAGNOSIS — M54.42 CHRONIC MIDLINE LOW BACK PAIN WITH BILATERAL SCIATICA: ICD-10-CM

## 2025-05-19 DIAGNOSIS — M54.41 CHRONIC MIDLINE LOW BACK PAIN WITH BILATERAL SCIATICA: ICD-10-CM

## 2025-05-19 RX ORDER — LIDOCAINE 50 MG/G
1 PATCH TOPICAL DAILY
Qty: 30 PATCH | Refills: 2 | Status: SHIPPED | OUTPATIENT
Start: 2025-05-19

## 2025-05-19 NOTE — TELEPHONE ENCOUNTER
Last office visit 1/22/2025     Last written 3/10/2025     Next office visit scheduled 7/23/2025    Requested Prescriptions     Pending Prescriptions Disp Refills    lidocaine (LIDODERM) 5 % 30 patch 2     Sig: Place 1 patch onto the skin daily 12 hours on, 12 hours off.

## 2025-05-27 ENCOUNTER — OFFICE VISIT (OUTPATIENT)
Dept: ORTHOPEDIC SURGERY | Age: 30
End: 2025-05-27
Payer: COMMERCIAL

## 2025-05-27 VITALS — HEIGHT: 72 IN | BODY MASS INDEX: 36.48 KG/M2

## 2025-05-27 DIAGNOSIS — M47.812 CERVICAL SPONDYLOSIS: Primary | ICD-10-CM

## 2025-05-27 PROCEDURE — G8417 CALC BMI ABV UP PARAM F/U: HCPCS | Performed by: PHYSICIAN ASSISTANT

## 2025-05-27 PROCEDURE — G8427 DOCREV CUR MEDS BY ELIG CLIN: HCPCS | Performed by: PHYSICIAN ASSISTANT

## 2025-05-27 PROCEDURE — 4004F PT TOBACCO SCREEN RCVD TLK: CPT | Performed by: PHYSICIAN ASSISTANT

## 2025-05-27 PROCEDURE — 99213 OFFICE O/P EST LOW 20 MIN: CPT | Performed by: PHYSICIAN ASSISTANT

## 2025-05-27 NOTE — PROGRESS NOTES
is present. There is no step-off or paraspinal spasm.   Range of Motion: Cervical flexion, extension, and rotation are moderately reduced with pain.  Strength: 5/5 bilateral upper extremities   Gait & station: normal, patient ambulates without assistance.        X Rays: not performed in the office today:     MRI cervical spine dated 3/4/2025:  MPRESSION:  Mild degenerative disc disease and facet arthropathy C4-C5 and C5-C6.  Negative for large protrusion, high-grade central stenosis or foraminal  narrowing.       Diagnosis:       ICD-10-CM    1. Cervical spondylosis  M47.812 FABIEN - Bladimir Valdovinos MD, Pain Management, [facility]           Assessment and Plan:       Assessment:  Ever is a 30-year-old male who has mild degenerative disc disease and facet arthropathy at C4-5 and C5-6.  MRI  Was negative for large disc protrusion or high-grade central stenosis or foraminal narrowing.      I had an extensive discussion with Mr. Moises Ponce regarding the natural history, etiology, and long term consequences of his condition. I have presented reasonable alternatives to the patient's proposed care, treatment, and services.  Risks and benefits of the treatment options also reviewed in detail. I have outlined a treatment plan with them. He has had full opportunity to ask his questions.  I have answered them all to his satisfaction.  I feel that . Moises Ponce understands our discussion today.     Plan:  Procedures-     At this time, I do not believe spinal surgery is indicated. He may benefit other therapeutic options such as epidural steroid injection, facet injection or other interventional procedures.  For this reason, I am going to refer to Dr. Bladimir Valdovinos for Interventional Physiatry Consultation for an evaluation and treatment.        Follow up-    Call or return to clinic if these symptoms worsen or fail to improve as anticipated.    The total time spent on today's visit including

## 2025-06-11 ENCOUNTER — PATIENT MESSAGE (OUTPATIENT)
Dept: PRIMARY CARE CLINIC | Age: 30
End: 2025-06-11

## 2025-06-11 DIAGNOSIS — M47.812 CERVICAL SPONDYLOSIS: Primary | ICD-10-CM
